# Patient Record
Sex: FEMALE | Race: WHITE | HISPANIC OR LATINO | Employment: FULL TIME | URBAN - METROPOLITAN AREA
[De-identification: names, ages, dates, MRNs, and addresses within clinical notes are randomized per-mention and may not be internally consistent; named-entity substitution may affect disease eponyms.]

---

## 2022-03-03 ENCOUNTER — TELEPHONE (OUTPATIENT)
Dept: SCHEDULING | Facility: IMAGING CENTER | Age: 37
End: 2022-03-03

## 2022-06-16 ENCOUNTER — APPOINTMENT (OUTPATIENT)
Dept: RADIOLOGY | Facility: MEDICAL CENTER | Age: 37
End: 2022-06-16
Attending: EMERGENCY MEDICINE
Payer: COMMERCIAL

## 2022-06-16 ENCOUNTER — HOSPITAL ENCOUNTER (EMERGENCY)
Facility: MEDICAL CENTER | Age: 37
End: 2022-06-16
Attending: EMERGENCY MEDICINE
Payer: COMMERCIAL

## 2022-06-16 VITALS
TEMPERATURE: 97.5 F | OXYGEN SATURATION: 93 % | BODY MASS INDEX: 36.31 KG/M2 | DIASTOLIC BLOOD PRESSURE: 87 MMHG | HEART RATE: 111 BPM | SYSTOLIC BLOOD PRESSURE: 142 MMHG | WEIGHT: 184.97 LBS | RESPIRATION RATE: 15 BRPM | HEIGHT: 60 IN

## 2022-06-16 DIAGNOSIS — K80.20 CALCULUS OF GALLBLADDER WITHOUT CHOLECYSTITIS WITHOUT OBSTRUCTION: ICD-10-CM

## 2022-06-16 LAB
ALBUMIN SERPL BCP-MCNC: 4.6 G/DL (ref 3.2–4.9)
ALBUMIN/GLOB SERPL: 1.3 G/DL
ALP SERPL-CCNC: 155 U/L (ref 30–99)
ALT SERPL-CCNC: 68 U/L (ref 2–50)
ANION GAP SERPL CALC-SCNC: 14 MMOL/L (ref 7–16)
APPEARANCE UR: CLEAR
AST SERPL-CCNC: 51 U/L (ref 12–45)
BASOPHILS # BLD AUTO: 0.4 % (ref 0–1.8)
BASOPHILS # BLD: 0.04 K/UL (ref 0–0.12)
BILIRUB SERPL-MCNC: 0.3 MG/DL (ref 0.1–1.5)
BILIRUB UR QL STRIP.AUTO: NEGATIVE
BUN SERPL-MCNC: 7 MG/DL (ref 8–22)
CALCIUM SERPL-MCNC: 9.6 MG/DL (ref 8.5–10.5)
CHLORIDE SERPL-SCNC: 101 MMOL/L (ref 96–112)
CO2 SERPL-SCNC: 22 MMOL/L (ref 20–33)
COLOR UR: YELLOW
CREAT SERPL-MCNC: 0.59 MG/DL (ref 0.5–1.4)
EOSINOPHIL # BLD AUTO: 0.1 K/UL (ref 0–0.51)
EOSINOPHIL NFR BLD: 1.1 % (ref 0–6.9)
ERYTHROCYTE [DISTWIDTH] IN BLOOD BY AUTOMATED COUNT: 34.6 FL (ref 35.9–50)
GFR SERPLBLD CREATININE-BSD FMLA CKD-EPI: 120 ML/MIN/1.73 M 2
GLOBULIN SER CALC-MCNC: 3.6 G/DL (ref 1.9–3.5)
GLUCOSE SERPL-MCNC: 197 MG/DL (ref 65–99)
GLUCOSE UR STRIP.AUTO-MCNC: NEGATIVE MG/DL
HCG SERPL QL: NEGATIVE
HCT VFR BLD AUTO: 42.7 % (ref 37–47)
HGB BLD-MCNC: 13.2 G/DL (ref 12–16)
IMM GRANULOCYTES # BLD AUTO: 0.03 K/UL (ref 0–0.11)
IMM GRANULOCYTES NFR BLD AUTO: 0.3 % (ref 0–0.9)
KETONES UR STRIP.AUTO-MCNC: NEGATIVE MG/DL
LEUKOCYTE ESTERASE UR QL STRIP.AUTO: NEGATIVE
LIPASE SERPL-CCNC: 39 U/L (ref 11–82)
LYMPHOCYTES # BLD AUTO: 1.96 K/UL (ref 1–4.8)
LYMPHOCYTES NFR BLD: 21.6 % (ref 22–41)
MCH RBC QN AUTO: 22 PG (ref 27–33)
MCHC RBC AUTO-ENTMCNC: 30.9 G/DL (ref 33.6–35)
MCV RBC AUTO: 71.3 FL (ref 81.4–97.8)
MICRO URNS: NORMAL
MONOCYTES # BLD AUTO: 0.45 K/UL (ref 0–0.85)
MONOCYTES NFR BLD AUTO: 5 % (ref 0–13.4)
NEUTROPHILS # BLD AUTO: 6.5 K/UL (ref 2–7.15)
NEUTROPHILS NFR BLD: 71.6 % (ref 44–72)
NITRITE UR QL STRIP.AUTO: NEGATIVE
NRBC # BLD AUTO: 0 K/UL
NRBC BLD-RTO: 0 /100 WBC
PH UR STRIP.AUTO: 6.5 [PH] (ref 5–8)
PLATELET # BLD AUTO: 293 K/UL (ref 164–446)
PMV BLD AUTO: 9.9 FL (ref 9–12.9)
POTASSIUM SERPL-SCNC: 4.2 MMOL/L (ref 3.6–5.5)
PROT SERPL-MCNC: 8.2 G/DL (ref 6–8.2)
PROT UR QL STRIP: NEGATIVE MG/DL
RBC # BLD AUTO: 5.99 M/UL (ref 4.2–5.4)
RBC UR QL AUTO: NEGATIVE
SODIUM SERPL-SCNC: 137 MMOL/L (ref 135–145)
SP GR UR STRIP.AUTO: 1.01
UROBILINOGEN UR STRIP.AUTO-MCNC: 0.2 MG/DL
WBC # BLD AUTO: 9.1 K/UL (ref 4.8–10.8)

## 2022-06-16 PROCEDURE — 76705 ECHO EXAM OF ABDOMEN: CPT

## 2022-06-16 PROCEDURE — 99222 1ST HOSP IP/OBS MODERATE 55: CPT | Performed by: SURGERY

## 2022-06-16 PROCEDURE — 36415 COLL VENOUS BLD VENIPUNCTURE: CPT

## 2022-06-16 PROCEDURE — 85025 COMPLETE CBC W/AUTO DIFF WBC: CPT

## 2022-06-16 PROCEDURE — 80053 COMPREHEN METABOLIC PANEL: CPT

## 2022-06-16 PROCEDURE — 81003 URINALYSIS AUTO W/O SCOPE: CPT

## 2022-06-16 PROCEDURE — 99284 EMERGENCY DEPT VISIT MOD MDM: CPT

## 2022-06-16 PROCEDURE — 700105 HCHG RX REV CODE 258: Performed by: EMERGENCY MEDICINE

## 2022-06-16 PROCEDURE — 83690 ASSAY OF LIPASE: CPT

## 2022-06-16 PROCEDURE — 84703 CHORIONIC GONADOTROPIN ASSAY: CPT

## 2022-06-16 RX ORDER — SODIUM CHLORIDE 9 MG/ML
1000 INJECTION, SOLUTION INTRAVENOUS ONCE
Status: COMPLETED | OUTPATIENT
Start: 2022-06-16 | End: 2022-06-16

## 2022-06-16 RX ADMIN — SODIUM CHLORIDE 1000 ML: 9 INJECTION, SOLUTION INTRAVENOUS at 15:09

## 2022-06-16 NOTE — CONSULTS
DATE OF CONSULTATION:  6/16/2022     REFERRING PHYSICIAN:   Asad Garber M.D.     CONSULTING PHYSICIAN:  Regulo Elder M.D.     REASON FOR CONSULTATION:  I have been asked by  to see the patient in surgical consultation for evaluation of cholelithiasis.    HISTORY OF PRESENT ILLNESS: The patient is a pleasant young woman who presents to the Emergency Department a first episode of biliary colic.  Her pain came on suddenly after a meal last night and was localized to the right upper quadrant.  It was colicky in nature with radiation to the epigastrium.  She denies any significant nausea or vomiting.  She denies any previous attacks.  She has not undergone any previous abdominal surgery.    PAST MEDICAL HISTORY:  has no past medical history on file.    PAST SURGICAL HISTORY:  has no past surgical history on file.    ALLERGIES: Not on File    CURRENT MEDICATIONS:    Home Medications       Reviewed by Denisse Oh R.N. (Registered Nurse) on 06/16/22 at 1043  Med List Status: Partial     Medication Last Dose Status        Patient Joseph Taking any Medications                         FAMILY HISTORY: family history is not on file.    SOCIAL HISTORY:  reports that she has never smoked. She has never used smokeless tobacco. She reports that she does not drink alcohol and does not use drugs.    REVIEW OF SYSTEMS: Comprehensive review of systems is negative with the exception of the aforementioned HPI, PMH, and PSH bullets in accordance with CMS guidelines.    PHYSICAL EXAMINATION:      Constitutional:     Vital Signs: /88   Pulse (!) 105   Temp 36.7 °C (98 °F) (Temporal)   Resp 16   Ht 1.524 m (5')   Wt 83.9 kg (184 lb 15.5 oz)   SpO2 97%    General Appearance: appears stated age, is in no apparent distress.  HEENT: The pupils are equal, round, and reactive to light bilaterally. The extraocular muscles are intact bilaterally. The sclera are non icteric. Nares and oropharynx are clear.   Neck: Supple.  No adenopathy.  Respiratory:   Inspection: Unlabored respirations, no intercostal retractions, paradoxical motion, or accessory muscle use.   Auscultation: clear to auscultation.  Cardiovascular:   Inspection: The skin is warm, dry, and well purfused.  Auscultation: Regular rate and rhythm.   Peripheral Pulses: Normal.   Abdomen:  Inspection: Abdominal inspection reveals  no abdominal distension.   Palpation: Palpation is remarkable for mild tenderness in the right subcostal region. No abdominal wall hernias.  Extremities:   Examination of the upper and lower extremities demonstrates no cyanosis edema or clubbing.  Neurologic:   Alert & oriented to person, time and place. Normal motor function. Normal sensory function. No focal deficits noted.    LABORATORY VALUES:   Recent Labs     06/16/22  1100   WBC 9.1   RBC 5.99*   HEMOGLOBIN 13.2   HEMATOCRIT 42.7   MCV 71.3*   MCH 22.0*   MCHC 30.9*   RDW 34.6*   PLATELETCT 293   MPV 9.9     Recent Labs     06/16/22  1100   SODIUM 137   POTASSIUM 4.2   CHLORIDE 101   CO2 22   GLUCOSE 197*   BUN 7*   CREATININE 0.59   CALCIUM 9.6     Recent Labs     06/16/22  1100   ASTSGOT 51*   ALTSGPT 68*   TBILIRUBIN 0.3   ALKPHOSPHAT 155*   GLOBULIN 3.6*     IMAGING:   US-RUQ   Final Result      1. Hepatomegaly and hepatic steatosis.   2. Cholelithiasis with no gallbladder wall thickening or pericholecystic fluid.   3. Dilated common bile duct, without intrahepatic biliary dilation.   4. The remainder of the right upper quadrant abdominal sonogram is within normal limits.          ASSESSMENT AND PLAN:     The patient has Grade I (mild) acute cholecystitis.     I had a lengthy discussion with the patient regarding the signs, symptoms, and natural history of biliary disease.  I explained that although she does have gallstones, a single attack does not mandate surgical intervention.  The surgical conduct was explained including the inherent risks and potential complications.  Nonsurgical  modalities were also explained.  I counseled her that it would be reasonable to remove her gallbladder following this first attempt especially in light of a dilated common bile duct and the potential for historical passage of a stone or sludge.  However, at this time she would prefer to pursue a course of watchful waiting and I think this is reasonable decision.  She was encouraged to follow-up with her regular physician or the emergency department for any future or recurrent biliary symptoms.  She expresses understanding of these instructions and states that she will comply.       ____________________________________     Regulo Elder M.D.    DD: 6/16/2022  3:41 PM

## 2022-06-16 NOTE — ED PROVIDER NOTES
ED Provider Note    CHIEF COMPLAINT  Chief Complaint   Patient presents with   • Abdominal Pain     RLQ since yesterday, worse this morning. Denies N/V, fevers       HPI  Mabel Roque is a 36 y.o. female who presents complaining of abdominal pain.  The nurses, at the right lower quadrant, however she holds up in her right upper quadrant as she describes it.  Started as a dullness yesterday, today it was sharper.  Is been persistent.  Nothing really makes it better or worse.  She did the biscuit for breakfast but has not had much else to eat or drink, since that time.  Denies any nausea vomiting she does not have an appetite.  Is been no change in bowel or bladder.  She never had this before.  There is no other complaint.    PAST MEDICAL HISTORY  None    FAMILY HISTORY  History reviewed. No pertinent family history.    SOCIAL HISTORY  Social History     Tobacco Use   • Smoking status: Never Smoker   • Smokeless tobacco: Never Used   Vaping Use   • Vaping Use: Never used   Substance Use Topics   • Alcohol use: Never   • Drug use: Never       SURGICAL HISTORY  History reviewed. No pertinent surgical history.    CURRENT MEDICATIONS  Home Medications     Reviewed by Denisse Oh R.N. (Registered Nurse) on 06/16/22 at 1043  Med List Status: Partial   Medication Last Dose Status        Patient Joseph Taking any Medications                       I have reviewed the nurses notes and/or the list brought with the patient.    ALLERGIES  Not on File    REVIEW OF SYSTEMS  See HPI for further details. Review of systems as above, otherwise all other systems are negative.     PHYSICAL EXAM  VITAL SIGNS: BP (!) 171/109 Comment: SECOND 161/108  Pulse (!) 124   Temp 36.7 °C (98 °F) (Temporal)   Resp 16   Ht 1.524 m (5')   Wt 83.9 kg (184 lb 15.5 oz)   SpO2 93%   BMI 36.12 kg/m²     Constitutional: Well appearing patient in no acute distress.  Not toxic, nor ill in appearance.  HENT: Mucus membranes moist.  Oropharynx  is clear.  Eyes: Pupils equally round.  No scleral icterus.   Neck: Full nontender range of motion.  Lymphatic: No cervical lymphadenopathy noted.   Cardiovascular: Regular heart rate and rhythm.  No murmurs, rubs, nor gallop appreciated.   Thorax & Lungs: Chest is nontender.  Lungs are clear to auscultation with good air movement bilaterally.  No wheeze, rhonchi, nor rales.   Abdomen: Soft, with focal tenderness in the right upper quadrant.  Positive clinical Villagran sign.  No rebound or guarding.  No masses.  Skin: No purpura nor petechia noted.  Extremities/Musculoskeletal: No sign of trauma.  Calves are nontender with no cords nor edema.  No Misty's sign.  Pulses are intact all around.   Neurologic: Alert & oriented.  Strength and sensation is intact all around.  Gait is normal.  Psychiatric: Normal affect appropriate for the clinical situation.  LABS  Labs Reviewed   CBC WITH DIFFERENTIAL - Abnormal; Notable for the following components:       Result Value    RBC 5.99 (*)     MCV 71.3 (*)     MCH 22.0 (*)     MCHC 30.9 (*)     RDW 34.6 (*)     Lymphocytes 21.60 (*)     All other components within normal limits   COMP METABOLIC PANEL - Abnormal; Notable for the following components:    Glucose 197 (*)     Bun 7 (*)     AST(SGOT) 51 (*)     ALT(SGPT) 68 (*)     Alkaline Phosphatase 155 (*)     Globulin 3.6 (*)     All other components within normal limits   HCG QUAL SERUM   URINALYSIS,CULTURE IF INDICATED    Narrative:     Indication for culture:->Patient WITHOUT an indwelling Gaming  catheter in place with new onset of Dysuria, Frequency,  Urgency, and/or Suprapubic pain   ESTIMATED GFR   LIPASE         RADIOLOGY/PROCEDURES  I have reviewed the patient's film interpretations myself, and they are read out by the radiologist as:   US-RUQ   Final Result      1. Hepatomegaly and hepatic steatosis.   2. Cholelithiasis with no gallbladder wall thickening or pericholecystic fluid.   3. Dilated common bile duct, without  intrahepatic biliary dilation.   4. The remainder of the right upper quadrant abdominal sonogram is within normal limits.        .    MEDICAL RECORD  I have reviewed patient's medical record and pertinent results are listed above.    COURSE & MEDICAL DECISION MAKING  I have reviewed any medical record information, laboratory studies and radiographic results as noted above.  This patient presents with abdominal pain.  Her examination concerning for gallbladder etiology.  Basic laboratories were obtained.  He has a minimal transaminitis but no elevation of her bilirubin.  There is no pancreatitis.  She is not pregnant.  Kidney function is normal.  Sonogram shows gallstones without gallbladder wall thickening or pericholecystic fluid.  She does have a dilated common bile duct but there is no intrahepatic biliary dilatation seen.  Upon recheck, she is still mildly tender.  I called discussed the patient's case with Dr. Masoud Elder, general surgery, who has come down and seen the patient.  The patient will be going home and following up with him as an outpatient.  We discussed return precautions to include increasing pain, fever, or any turn for the worse.  I advised her to avoid greasy or fatty foods.  Plenty of fluids.  In the meantime her significant other will be keeping an eye on her.      FINAL IMPRESSION  1. Calculus of gallbladder without cholecystitis without obstruction           This dictation was created using voice recognition software.    Electronically signed by: Asad Garber M.D., 6/16/2022 1:02 PM

## 2022-06-16 NOTE — ED TRIAGE NOTES
Chief Complaint   Patient presents with   • Abdominal Pain     RLQ since yesterday, worse this morning. Denies N/V, fevers       Patient to triage ambulatory with a steady gait, AAOx4, Appropriate precautions in place.     Explained wait time and triage process. Placed back in lobby. Told to notify ED tech or RN of any changes, verbalized understanding.    BP (!) 171/109 Comment: SECOND 161/108  Pulse (!) 124   Temp 36.7 °C (98 °F) (Temporal)   Resp 16   Ht 1.524 m (5')   Wt 83.9 kg (184 lb 15.5 oz)   SpO2 93%   BMI 36.12 kg/m²

## 2022-06-16 NOTE — ED NOTES
Provided pt with written and verbal instructions regarding follow-up and activity. All questions answered and patient verbalized understanding. Pt ambulated out of unit with steady gait.

## 2022-09-20 ENCOUNTER — TELEPHONE (OUTPATIENT)
Dept: SCHEDULING | Facility: IMAGING CENTER | Age: 37
End: 2022-09-20

## 2022-09-26 ENCOUNTER — OFFICE VISIT (OUTPATIENT)
Dept: MEDICAL GROUP | Facility: IMAGING CENTER | Age: 37
End: 2022-09-26
Payer: COMMERCIAL

## 2022-09-26 VITALS
BODY MASS INDEX: 3.79 KG/M2 | HEIGHT: 60 IN | SYSTOLIC BLOOD PRESSURE: 140 MMHG | RESPIRATION RATE: 16 BRPM | OXYGEN SATURATION: 96 % | DIASTOLIC BLOOD PRESSURE: 90 MMHG | WEIGHT: 19.3 LBS | TEMPERATURE: 98.3 F | HEART RATE: 98 BPM

## 2022-09-26 DIAGNOSIS — Z23 NEED FOR VACCINATION: ICD-10-CM

## 2022-09-26 DIAGNOSIS — M1A.0790 CHRONIC GOUT OF FOOT, UNSPECIFIED CAUSE, UNSPECIFIED LATERALITY: ICD-10-CM

## 2022-09-26 DIAGNOSIS — Z13.6 SCREENING FOR CARDIOVASCULAR CONDITION: ICD-10-CM

## 2022-09-26 DIAGNOSIS — Z13.1 DIABETES MELLITUS SCREENING: ICD-10-CM

## 2022-09-26 PROCEDURE — 90715 TDAP VACCINE 7 YRS/> IM: CPT | Performed by: CLINICAL NURSE SPECIALIST

## 2022-09-26 PROCEDURE — 99204 OFFICE O/P NEW MOD 45 MIN: CPT | Mod: 25 | Performed by: CLINICAL NURSE SPECIALIST

## 2022-09-26 PROCEDURE — 90471 IMMUNIZATION ADMIN: CPT | Performed by: CLINICAL NURSE SPECIALIST

## 2022-09-26 RX ORDER — ALLOPURINOL 100 MG/1
100 TABLET ORAL DAILY
Qty: 30 TABLET | Refills: 0 | Status: SHIPPED | OUTPATIENT
Start: 2022-09-26 | End: 2022-10-10 | Stop reason: SDUPTHER

## 2022-09-26 RX ORDER — COLCHICINE 0.6 MG/1
TABLET ORAL
COMMUNITY
Start: 2022-07-08 | End: 2022-09-26 | Stop reason: SDUPTHER

## 2022-09-26 RX ORDER — COLCHICINE 0.6 MG/1
TABLET ORAL
Qty: 12 TABLET | Refills: 0 | Status: SHIPPED | OUTPATIENT
Start: 2022-09-26 | End: 2023-09-14

## 2022-09-26 ASSESSMENT — PATIENT HEALTH QUESTIONNAIRE - PHQ9: CLINICAL INTERPRETATION OF PHQ2 SCORE: 0

## 2022-09-26 ASSESSMENT — PAIN SCALES - GENERAL: PAINLEVEL: NO PAIN

## 2022-09-26 ASSESSMENT — FIBROSIS 4 INDEX: FIB4 SCORE: 0.76

## 2022-09-26 NOTE — ASSESSMENT & PLAN NOTE
Gout usually in toes but sometimes in other foot areas for 4-5 years. Exacerbations multiple times annually and flares lasting longer and hurting more. She is interested in allopurinol.

## 2022-09-26 NOTE — PROGRESS NOTES
Subjective     Mabel Roque is a 37 y.o. female who presents with hospitals Care and Other (Gout x4 years )            HPI  Chronic gout of foot  Gout usually in toes but sometimes in other foot areas for 4-5 years. Exacerbations multiple times annually and flares lasting longer and hurting more. She is interested in allopurinol.      ROS  See HPI    No Known Allergies    No current outpatient medications on file prior to visit.     No current facility-administered medications on file prior to visit.              Objective     BP (!) 140/90   Pulse 98   Temp 36.8 °C (98.3 °F) (Temporal)   Resp 16   Ht 1.524 m (5')   Wt (!) 8.754 kg (19 lb 4.8 oz)   LMP  (LMP Unknown)   SpO2 96%   BMI 3.77 kg/m²      Physical Exam  Constitutional:       General: She is not in acute distress.     Appearance: Normal appearance. She is not ill-appearing, toxic-appearing or diaphoretic.   HENT:      Head: Normocephalic and atraumatic.   Eyes:      General: No scleral icterus.     Pupils: Pupils are equal, round, and reactive to light.   Cardiovascular:      Rate and Rhythm: Normal rate.   Pulmonary:      Effort: Pulmonary effort is normal.   Skin:     General: Skin is warm and dry.   Neurological:      Mental Status: She is alert and oriented to person, place, and time.      Gait: Gait normal.   Psychiatric:         Mood and Affect: Mood normal.         Behavior: Behavior normal.         Thought Content: Thought content normal.         Judgment: Judgment normal.                           Assessment & Plan        1. Chronic gout of foot, unspecified cause, unspecified laterality  Chronic uncontrolled.  We will start allopurinol 100 mg daily.  She will obtain labs in 2 to 4 weeks.  As there is a risk of gout flare with the start of allopurinol, she will be given colchicine to be taken only if she experiences a flare.    Start allopurinol 100 mg daily    - allopurinol (ZYLOPRIM) 100 MG Tab; Take 1 Tablet by mouth every day.   Dispense: 30 Tablet; Refill: 0  - colchicine (COLCRYS) 0.6 MG Tab; 1.2 mg at the first sign of flare, followed by 0.6 mg after 1 hour  Dispense: 12 Tablet; Refill: 0  - CBC WITH DIFFERENTIAL; Future  - Comp Metabolic Panel; Future  - URIC ACID; Future    2. Diabetes mellitus screening    - Comp Metabolic Panel; Future  - HEMOGLOBIN A1C; Future    3. Screening for cardiovascular condition    - Lipid Profile; Future  - TSH WITH REFLEX TO FT4; Future    4. Need for vaccination    - Tdap Vaccine =>6YO IM     Return if symptoms worsen or fail to improve, for 2-4 weeks, Med check.

## 2022-10-10 ENCOUNTER — OFFICE VISIT (OUTPATIENT)
Dept: MEDICAL GROUP | Facility: IMAGING CENTER | Age: 37
End: 2022-10-10
Payer: COMMERCIAL

## 2022-10-10 VITALS
HEART RATE: 84 BPM | OXYGEN SATURATION: 98 % | TEMPERATURE: 99 F | HEIGHT: 60 IN | WEIGHT: 180.4 LBS | BODY MASS INDEX: 35.42 KG/M2 | DIASTOLIC BLOOD PRESSURE: 80 MMHG | SYSTOLIC BLOOD PRESSURE: 122 MMHG

## 2022-10-10 DIAGNOSIS — M1A.0790 CHRONIC GOUT OF FOOT, UNSPECIFIED CAUSE, UNSPECIFIED LATERALITY: ICD-10-CM

## 2022-10-10 PROCEDURE — 99213 OFFICE O/P EST LOW 20 MIN: CPT | Performed by: CLINICAL NURSE SPECIALIST

## 2022-10-10 RX ORDER — ALLOPURINOL 100 MG/1
100 TABLET ORAL DAILY
Qty: 90 TABLET | Refills: 3 | Status: SHIPPED | OUTPATIENT
Start: 2022-10-10 | End: 2023-09-14 | Stop reason: SDUPTHER

## 2022-10-10 ASSESSMENT — PAIN SCALES - GENERAL: PAINLEVEL: NO PAIN

## 2022-10-10 ASSESSMENT — FIBROSIS 4 INDEX: FIB4 SCORE: 0.76

## 2022-10-10 NOTE — PROGRESS NOTES
Subjective     Mabel Roque is a 37 y.o. female who presents with Follow-Up            HPI  Blood pressure reading in healthy range today.    Chronic gout of foot  Tolerating allopurinol well. No flares.    ROS  See HPI     No Known Allergies    Current Outpatient Medications on File Prior to Visit   Medication Sig Dispense Refill    colchicine (COLCRYS) 0.6 MG Tab 1.2 mg at the first sign of flare, followed by 0.6 mg after 1 hour 12 Tablet 0     No current facility-administered medications on file prior to visit.           Objective     /80 (BP Location: Left arm, Patient Position: Sitting, BP Cuff Size: Adult)   Pulse 84   Temp 37.2 °C (99 °F) (Temporal)   Ht 1.524 m (5')   Wt 81.8 kg (180 lb 6.4 oz)   LMP  (LMP Unknown)   SpO2 98%   BMI 35.23 kg/m²      Physical Exam  Constitutional:       General: She is not in acute distress.     Appearance: Normal appearance. She is not ill-appearing, toxic-appearing or diaphoretic.   HENT:      Head: Normocephalic and atraumatic.   Eyes:      Pupils: Pupils are equal, round, and reactive to light.   Cardiovascular:      Rate and Rhythm: Normal rate and regular rhythm.      Pulses: Normal pulses.   Pulmonary:      Effort: Pulmonary effort is normal.   Skin:     General: Skin is warm and dry.   Neurological:      Mental Status: She is alert.      Gait: Gait normal.   Psychiatric:         Mood and Affect: Mood normal.         Behavior: Behavior normal.         Thought Content: Thought content normal.         Judgment: Judgment normal.                           Assessment & Plan        1. Chronic gout of foot, unspecified cause, unspecified laterality  Continue allopurinol 100mg.     - allopurinol (ZYLOPRIM) 100 MG Tab; Take 1 Tablet by mouth every day.  Dispense: 90 Tablet; Refill: 3       Return if symptoms worsen or fail to improve, for With test results.

## 2022-10-18 ENCOUNTER — APPOINTMENT (OUTPATIENT)
Dept: LAB | Facility: MEDICAL CENTER | Age: 37
End: 2022-10-18
Payer: COMMERCIAL

## 2022-10-24 ENCOUNTER — HOSPITAL ENCOUNTER (OUTPATIENT)
Dept: LAB | Facility: MEDICAL CENTER | Age: 37
End: 2022-10-24
Attending: CLINICAL NURSE SPECIALIST
Payer: COMMERCIAL

## 2022-10-24 DIAGNOSIS — M1A.0790 CHRONIC GOUT OF FOOT, UNSPECIFIED CAUSE, UNSPECIFIED LATERALITY: ICD-10-CM

## 2022-10-24 DIAGNOSIS — Z13.1 DIABETES MELLITUS SCREENING: ICD-10-CM

## 2022-10-24 DIAGNOSIS — Z13.6 SCREENING FOR CARDIOVASCULAR CONDITION: ICD-10-CM

## 2022-10-24 LAB
ALBUMIN SERPL BCP-MCNC: 4.6 G/DL (ref 3.2–4.9)
ALBUMIN/GLOB SERPL: 1.4 G/DL
ALP SERPL-CCNC: 154 U/L (ref 30–99)
ALT SERPL-CCNC: 58 U/L (ref 2–50)
ANION GAP SERPL CALC-SCNC: 16 MMOL/L (ref 7–16)
AST SERPL-CCNC: 33 U/L (ref 12–45)
BASOPHILS # BLD AUTO: 0.7 % (ref 0–1.8)
BASOPHILS # BLD: 0.05 K/UL (ref 0–0.12)
BILIRUB SERPL-MCNC: 0.3 MG/DL (ref 0.1–1.5)
BUN SERPL-MCNC: 12 MG/DL (ref 8–22)
CALCIUM SERPL-MCNC: 9.8 MG/DL (ref 8.5–10.5)
CHLORIDE SERPL-SCNC: 102 MMOL/L (ref 96–112)
CHOLEST SERPL-MCNC: 229 MG/DL (ref 100–199)
CO2 SERPL-SCNC: 22 MMOL/L (ref 20–33)
CREAT SERPL-MCNC: 0.66 MG/DL (ref 0.5–1.4)
EOSINOPHIL # BLD AUTO: 0.14 K/UL (ref 0–0.51)
EOSINOPHIL NFR BLD: 1.8 % (ref 0–6.9)
ERYTHROCYTE [DISTWIDTH] IN BLOOD BY AUTOMATED COUNT: 36.3 FL (ref 35.9–50)
EST. AVERAGE GLUCOSE BLD GHB EST-MCNC: 154 MG/DL
FASTING STATUS PATIENT QL REPORTED: NORMAL
GFR SERPLBLD CREATININE-BSD FMLA CKD-EPI: 116 ML/MIN/1.73 M 2
GLOBULIN SER CALC-MCNC: 3.3 G/DL (ref 1.9–3.5)
GLUCOSE SERPL-MCNC: 156 MG/DL (ref 65–99)
HBA1C MFR BLD: 7 % (ref 4–5.6)
HCT VFR BLD AUTO: 43.8 % (ref 37–47)
HDLC SERPL-MCNC: 38 MG/DL
HGB BLD-MCNC: 13.2 G/DL (ref 12–16)
IMM GRANULOCYTES # BLD AUTO: 0.03 K/UL (ref 0–0.11)
IMM GRANULOCYTES NFR BLD AUTO: 0.4 % (ref 0–0.9)
LDLC SERPL CALC-MCNC: ABNORMAL MG/DL
LYMPHOCYTES # BLD AUTO: 1.96 K/UL (ref 1–4.8)
LYMPHOCYTES NFR BLD: 25.6 % (ref 22–41)
MCH RBC QN AUTO: 22 PG (ref 27–33)
MCHC RBC AUTO-ENTMCNC: 30.1 G/DL (ref 33.6–35)
MCV RBC AUTO: 72.9 FL (ref 81.4–97.8)
MONOCYTES # BLD AUTO: 0.3 K/UL (ref 0–0.85)
MONOCYTES NFR BLD AUTO: 3.9 % (ref 0–13.4)
NEUTROPHILS # BLD AUTO: 5.17 K/UL (ref 2–7.15)
NEUTROPHILS NFR BLD: 67.6 % (ref 44–72)
NRBC # BLD AUTO: 0 K/UL
NRBC BLD-RTO: 0 /100 WBC
PLATELET # BLD AUTO: 298 K/UL (ref 164–446)
PMV BLD AUTO: 10.7 FL (ref 9–12.9)
POTASSIUM SERPL-SCNC: 4.7 MMOL/L (ref 3.6–5.5)
PROT SERPL-MCNC: 7.9 G/DL (ref 6–8.2)
RBC # BLD AUTO: 6.01 M/UL (ref 4.2–5.4)
SODIUM SERPL-SCNC: 140 MMOL/L (ref 135–145)
TRIGL SERPL-MCNC: 528 MG/DL (ref 0–149)
TSH SERPL DL<=0.005 MIU/L-ACNC: 2.2 UIU/ML (ref 0.38–5.33)
URATE SERPL-MCNC: 7.1 MG/DL (ref 1.9–8.2)
WBC # BLD AUTO: 7.7 K/UL (ref 4.8–10.8)

## 2022-10-24 PROCEDURE — 80053 COMPREHEN METABOLIC PANEL: CPT

## 2022-10-24 PROCEDURE — 84443 ASSAY THYROID STIM HORMONE: CPT

## 2022-10-24 PROCEDURE — 84550 ASSAY OF BLOOD/URIC ACID: CPT

## 2022-10-24 PROCEDURE — 85025 COMPLETE CBC W/AUTO DIFF WBC: CPT

## 2022-10-24 PROCEDURE — 83036 HEMOGLOBIN GLYCOSYLATED A1C: CPT

## 2022-10-24 PROCEDURE — 80061 LIPID PANEL: CPT

## 2022-10-24 PROCEDURE — 36415 COLL VENOUS BLD VENIPUNCTURE: CPT

## 2022-11-14 ENCOUNTER — OFFICE VISIT (OUTPATIENT)
Dept: MEDICAL GROUP | Facility: IMAGING CENTER | Age: 37
End: 2022-11-14
Payer: COMMERCIAL

## 2022-11-14 VITALS
TEMPERATURE: 98.5 F | RESPIRATION RATE: 16 BRPM | HEART RATE: 90 BPM | BODY MASS INDEX: 35.85 KG/M2 | WEIGHT: 182.6 LBS | HEIGHT: 60 IN | OXYGEN SATURATION: 100 % | DIASTOLIC BLOOD PRESSURE: 80 MMHG | SYSTOLIC BLOOD PRESSURE: 126 MMHG

## 2022-11-14 DIAGNOSIS — E66.9 OBESITY (BMI 35.0-39.9 WITHOUT COMORBIDITY): ICD-10-CM

## 2022-11-14 DIAGNOSIS — R71.8 MICROCYTOSIS: ICD-10-CM

## 2022-11-14 DIAGNOSIS — M1A.0790 CHRONIC GOUT OF FOOT, UNSPECIFIED CAUSE, UNSPECIFIED LATERALITY: ICD-10-CM

## 2022-11-14 DIAGNOSIS — E11.65 TYPE 2 DIABETES MELLITUS WITH HYPERGLYCEMIA, WITHOUT LONG-TERM CURRENT USE OF INSULIN (HCC): ICD-10-CM

## 2022-11-14 DIAGNOSIS — R16.0 HEPATOMEGALY: ICD-10-CM

## 2022-11-14 DIAGNOSIS — E78.5 DYSLIPIDEMIA: ICD-10-CM

## 2022-11-14 PROCEDURE — 99214 OFFICE O/P EST MOD 30 MIN: CPT | Performed by: CLINICAL NURSE SPECIALIST

## 2022-11-14 ASSESSMENT — FIBROSIS 4 INDEX: FIB4 SCORE: 0.52

## 2022-11-14 ASSESSMENT — PAIN SCALES - GENERAL: PAINLEVEL: NO PAIN

## 2022-11-14 NOTE — ASSESSMENT & PLAN NOTE
Mabel's recent labs show markedly elevated triglycerides, low HDL and high total cholesterol.  LDL was not calculated due to elevated triglycerides.  She also has concurrent diabetes.

## 2022-11-14 NOTE — ASSESSMENT & PLAN NOTE
A1c at 7.0 today and fasting blood sugar continues to be elevated. She has not been diagnosed with diabetes in the past.

## 2022-11-14 NOTE — PROGRESS NOTES
Subjective     Mabel Roque is a 37 y.o. female who presents with Lab Results (From 10/24/22)            HPI  Type 2 diabetes mellitus with hyperglycemia, without long-term current use of insulin (HCC)  A1c at 7.0 today and fasting blood sugar continues to be elevated. She has not been diagnosed with diabetes in the past.    Chronic gout of foot  No flares since starting allopurinol. She also tries to control through diet.     Dyslipidemia  Mabel's recent labs show markedly elevated triglycerides, low HDL and high total cholesterol.  LDL was not calculated due to elevated triglycerides.  She also has concurrent diabetes.    Hepatomegaly  Hepatomegaly with steatosis seen on ultrasound from ER approximately 6 months ago.  Her liver function tests have improved slightly most significant improvement was with her AST.  Her alkaline phosphatase continues to be elevated.  She denies any abdominal pain or unusual bowel movements.  She also reports that she only rarely drinks alcohol.    Microcytosis  Mabel continues to have microcytosis without anemia.  She also has increased RBCs.  She does not recall ever having any issues with this and reports she does not know if her family has a history of thalassemia.    ROS  See HPI    No Known Allergies    Current Outpatient Medications on File Prior to Visit   Medication Sig Dispense Refill    allopurinol (ZYLOPRIM) 100 MG Tab Take 1 Tablet by mouth every day. 90 Tablet 3    colchicine (COLCRYS) 0.6 MG Tab 1.2 mg at the first sign of flare, followed by 0.6 mg after 1 hour 12 Tablet 0     No current facility-administered medications on file prior to visit.              Objective     /80 (BP Location: Left arm, Patient Position: Sitting, BP Cuff Size: Adult)   Pulse 90   Temp 36.9 °C (98.5 °F) (Temporal)   Resp 16   Ht 1.524 m (5')   Wt 82.8 kg (182 lb 9.6 oz)   LMP 10/27/2022 (Exact Date)   SpO2 100%   BMI 35.66 kg/m²      Physical  Exam  Constitutional:       General: She is not in acute distress.     Appearance: Normal appearance. She is not ill-appearing, toxic-appearing or diaphoretic.   HENT:      Head: Normocephalic and atraumatic.   Eyes:      General: No scleral icterus.     Pupils: Pupils are equal, round, and reactive to light.   Cardiovascular:      Rate and Rhythm: Normal rate.   Pulmonary:      Effort: Pulmonary effort is normal.   Skin:     General: Skin is warm and dry.   Neurological:      Mental Status: She is alert and oriented to person, place, and time.      Gait: Gait normal.   Psychiatric:         Mood and Affect: Mood normal.         Behavior: Behavior normal.         Thought Content: Thought content normal.         Judgment: Judgment normal.          Hospital Outpatient Visit on 10/24/2022   Component Date Value    WBC 10/24/2022 7.7     RBC 10/24/2022 6.01 (H)     Hemoglobin 10/24/2022 13.2     Hematocrit 10/24/2022 43.8     MCV 10/24/2022 72.9 (L)     MCH 10/24/2022 22.0 (L)     MCHC 10/24/2022 30.1 (L)     RDW 10/24/2022 36.3     Platelet Count 10/24/2022 298     MPV 10/24/2022 10.7     Neutrophils-Polys 10/24/2022 67.60     Lymphocytes 10/24/2022 25.60     Monocytes 10/24/2022 3.90     Eosinophils 10/24/2022 1.80     Basophils 10/24/2022 0.70     Immature Granulocytes 10/24/2022 0.40     Nucleated RBC 10/24/2022 0.00     Neutrophils (Absolute) 10/24/2022 5.17     Lymphs (Absolute) 10/24/2022 1.96     Monos (Absolute) 10/24/2022 0.30     Eos (Absolute) 10/24/2022 0.14     Baso (Absolute) 10/24/2022 0.05     Immature Granulocytes (a* 10/24/2022 0.03     NRBC (Absolute) 10/24/2022 0.00     Sodium 10/24/2022 140     Potassium 10/24/2022 4.7     Chloride 10/24/2022 102     Co2 10/24/2022 22     Anion Gap 10/24/2022 16.0     Glucose 10/24/2022 156 (H)     Bun 10/24/2022 12     Creatinine 10/24/2022 0.66     Calcium 10/24/2022 9.8     AST(SGOT) 10/24/2022 33     ALT(SGPT) 10/24/2022 58 (H)     Alkaline Phosphatase  10/24/2022 154 (H)     Total Bilirubin 10/24/2022 0.3     Albumin 10/24/2022 4.6     Total Protein 10/24/2022 7.9     Globulin 10/24/2022 3.3     A-G Ratio 10/24/2022 1.4     Glycohemoglobin 10/24/2022 7.0 (H)     Est Avg Glucose 10/24/2022 154     Cholesterol,Tot 10/24/2022 229 (H)     Triglycerides 10/24/2022 528 (H)     HDL 10/24/2022 38 (A)     LDL 10/24/2022 see below     TSH 10/24/2022 2.200     Uric Acid 10/24/2022 7.1     Fasting Status 10/24/2022 Fasting     GFR (CKD-EPI) 10/24/2022 116                           Assessment & Plan      1. Type 2 diabetes mellitus with hyperglycemia, without long-term current use of insulin (HCC)  New diagnosis.  Recent fasting blood sugar 156 with A1c of 7.0.  Results consistent with diabetes mellitus type 2.  Recent imaging also showed hepatomegaly and probable fatty infiltrates.    She will work on lifestyle improvements including healthy eating, reduction in short-chain carbs such as pasta, rice and white bread.  She will increase her exercise including at least 150 minutes a week of exercise that increases heart rate and elicits sweating.  We will also start metformin today.  Repeat labs in 3 months.    START metformin 500 mg once daily for 3 days then twice daily thereafter.    - MICROALBUMIN CREAT RATIO URINE; Future  - metFORMIN (GLUCOPHAGE) 500 MG Tab; Take 1 Tablet by mouth 2 times a day with meals.  Dispense: 60 Tablet; Refill: 0    2. Chronic gout of foot, unspecified cause, unspecified laterality  Chronic, controlled.      Continue allopurinol 100 mg daily    3. Dyslipidemia  New diagnosis.  Mabel's  triglycerides are markedly elevated, > 500.  This is concerning for an acutely increased risk of pancreatitis.  She does not have any abdominal pain currently.  Total cholesterol slightly elevated and HDL slightly low.  Unable to calculate LDL due to high triglycerides.  Discussed signs of pancreatitis such as acute severe abdominal pain.  If these develop, she  is to go to the emergency department    Mabel will eat a low-fat diet during the upcoming week and repeat this test fasting next week.  Lifestyle improvements as above.    - Lipid Profile; Future    4. Hepatomegaly  Incidental finding on ultrasound 6/2022.  Concurrent probable hepatic steatosis.  She has an elevated BMI of 35.  She also has elevated liver enzymes, some with recent improvement but her alkaline phosphatase continues to be elevated at 154.  Isoenzymes and hepatitis panel ordered.    - ALKALINE PHOSPHATASE ISOENZYMES; Future  - HEPATITIS PANEL ACUTE(4 COMPONENTS); Future    5. Microcytosis  New diagnosis.  Most recent 2 labs show microcytosis without anemia and increased red blood cell count.  Most recent RDW was normal.  This is most consistent with thalassemia trait.  No known family history of thalassemia.  Follow-up labs ordered.    - IRON/TOTAL IRON BIND; Future  - FERRITIN; Future  - HAPTOGLOBIN; Future  - LDH; Future  - Hemoglobin Evaluation Rflx Cascade; Future    Return if symptoms worsen or fail to improve, for With test results.    The patient verbalized agreement and understanding of the current plan. All questions and concerns were addressed at the time of visit.    This note was dictated using Dragon Software.  I have made every reasonable attempt to correct errors, but errors of grammar and content may still exist.

## 2022-11-14 NOTE — ASSESSMENT & PLAN NOTE
Hepatomegaly with steatosis seen on ultrasound from ER approximately 6 months ago.  Her liver function tests have improved slightly most significant improvement was with her AST.  Her alkaline phosphatase continues to be elevated.  She denies any abdominal pain or unusual bowel movements.  She also reports that she only rarely drinks alcohol.

## 2022-11-14 NOTE — ASSESSMENT & PLAN NOTE
Mabel continues to have microcytosis without anemia.  She also has increased RBCs.  She does not recall ever having any issues with this and reports she does not know if her family has a history of thalassemia.

## 2022-11-22 ENCOUNTER — HOSPITAL ENCOUNTER (OUTPATIENT)
Dept: LAB | Facility: MEDICAL CENTER | Age: 37
End: 2022-11-22
Attending: CLINICAL NURSE SPECIALIST
Payer: COMMERCIAL

## 2022-11-22 DIAGNOSIS — R16.0 HEPATOMEGALY: ICD-10-CM

## 2022-11-22 DIAGNOSIS — R71.8 MICROCYTOSIS: ICD-10-CM

## 2022-11-22 DIAGNOSIS — E11.65 TYPE 2 DIABETES MELLITUS WITH HYPERGLYCEMIA, WITHOUT LONG-TERM CURRENT USE OF INSULIN (HCC): ICD-10-CM

## 2022-11-22 DIAGNOSIS — E78.5 DYSLIPIDEMIA: ICD-10-CM

## 2022-11-22 LAB
BASOPHILS # BLD AUTO: 0.6 % (ref 0–1.8)
BASOPHILS # BLD: 0.04 K/UL (ref 0–0.12)
CHOLEST SERPL-MCNC: 223 MG/DL (ref 100–199)
CREAT UR-MCNC: 213.64 MG/DL
EOSINOPHIL # BLD AUTO: 0.14 K/UL (ref 0–0.51)
EOSINOPHIL NFR BLD: 2.2 % (ref 0–6.9)
ERYTHROCYTE [DISTWIDTH] IN BLOOD BY AUTOMATED COUNT: 35.5 FL (ref 35.9–50)
FERRITIN SERPL-MCNC: 326 NG/ML (ref 10–291)
HAV IGM SERPL QL IA: NORMAL
HBV CORE IGM SER QL: NORMAL
HBV SURFACE AG SER QL: NORMAL
HCT VFR BLD AUTO: 43.2 % (ref 37–47)
HCV AB SER QL: NORMAL
HDLC SERPL-MCNC: 39 MG/DL
HGB BLD-MCNC: 14 G/DL (ref 12–16)
IMM GRANULOCYTES # BLD AUTO: 0.02 K/UL (ref 0–0.11)
IMM GRANULOCYTES NFR BLD AUTO: 0.3 % (ref 0–0.9)
IRON SATN MFR SERPL: 26 % (ref 15–55)
IRON SERPL-MCNC: 95 UG/DL (ref 40–170)
LDH SERPL L TO P-CCNC: 231 U/L (ref 107–266)
LDLC SERPL CALC-MCNC: 121 MG/DL
LYMPHOCYTES # BLD AUTO: 1.7 K/UL (ref 1–4.8)
LYMPHOCYTES NFR BLD: 27.2 % (ref 22–41)
MCH RBC QN AUTO: 23.5 PG (ref 27–33)
MCHC RBC AUTO-ENTMCNC: 32.4 G/DL (ref 33.6–35)
MCV RBC AUTO: 72.6 FL (ref 81.4–97.8)
MICROALBUMIN UR-MCNC: 14.1 MG/DL
MICROALBUMIN/CREAT UR: 66 MG/G (ref 0–30)
MONOCYTES # BLD AUTO: 0.27 K/UL (ref 0–0.85)
MONOCYTES NFR BLD AUTO: 4.3 % (ref 0–13.4)
NEUTROPHILS # BLD AUTO: 4.07 K/UL (ref 2–7.15)
NEUTROPHILS NFR BLD: 65.4 % (ref 44–72)
NRBC # BLD AUTO: 0 K/UL
NRBC BLD-RTO: 0 /100 WBC
PLATELET # BLD AUTO: 339 K/UL (ref 164–446)
PMV BLD AUTO: 10.9 FL (ref 9–12.9)
RBC # BLD AUTO: 5.95 M/UL (ref 4.2–5.4)
TIBC SERPL-MCNC: 366 UG/DL (ref 250–450)
TRIGL SERPL-MCNC: 317 MG/DL (ref 0–149)
UIBC SERPL-MCNC: 271 UG/DL (ref 110–370)
WBC # BLD AUTO: 6.2 K/UL (ref 4.8–10.8)

## 2022-11-22 PROCEDURE — 80074 ACUTE HEPATITIS PANEL: CPT

## 2022-11-22 PROCEDURE — 83021 HEMOGLOBIN CHROMOTOGRAPHY: CPT

## 2022-11-22 PROCEDURE — 84075 ASSAY ALKALINE PHOSPHATASE: CPT

## 2022-11-22 PROCEDURE — 84080 ASSAY ALKALINE PHOSPHATASES: CPT

## 2022-11-22 PROCEDURE — 82570 ASSAY OF URINE CREATININE: CPT

## 2022-11-22 PROCEDURE — 83540 ASSAY OF IRON: CPT

## 2022-11-22 PROCEDURE — 83010 ASSAY OF HAPTOGLOBIN QUANT: CPT

## 2022-11-22 PROCEDURE — 83020 HEMOGLOBIN ELECTROPHORESIS: CPT

## 2022-11-22 PROCEDURE — 82043 UR ALBUMIN QUANTITATIVE: CPT

## 2022-11-22 PROCEDURE — 36415 COLL VENOUS BLD VENIPUNCTURE: CPT

## 2022-11-22 PROCEDURE — 83550 IRON BINDING TEST: CPT

## 2022-11-22 PROCEDURE — 80061 LIPID PANEL: CPT

## 2022-11-22 PROCEDURE — 83615 LACTATE (LD) (LDH) ENZYME: CPT

## 2022-11-22 PROCEDURE — 82728 ASSAY OF FERRITIN: CPT

## 2022-11-22 PROCEDURE — 85025 COMPLETE CBC W/AUTO DIFF WBC: CPT

## 2022-11-22 PROCEDURE — 81269 HBA1/HBA2 GENE DUP/DEL VRNTS: CPT

## 2022-11-24 LAB — HAPTOGLOB SERPL-MCNC: 208 MG/DL (ref 30–200)

## 2022-11-26 LAB
ALP BONE SERPL-CCNC: 57 U/L (ref 0–55)
ALP ISOS SERPL HS-CCNC: 0 U/L
ALP LIVER SERPL-CCNC: 121 U/L (ref 0–94)
ALP SERPL-CCNC: 178 U/L (ref 40–120)

## 2022-11-29 ENCOUNTER — OFFICE VISIT (OUTPATIENT)
Dept: MEDICAL GROUP | Facility: IMAGING CENTER | Age: 37
End: 2022-11-29
Payer: COMMERCIAL

## 2022-11-29 VITALS
HEART RATE: 85 BPM | WEIGHT: 176.2 LBS | OXYGEN SATURATION: 96 % | BODY MASS INDEX: 34.59 KG/M2 | SYSTOLIC BLOOD PRESSURE: 138 MMHG | RESPIRATION RATE: 16 BRPM | HEIGHT: 60 IN | TEMPERATURE: 97.9 F | DIASTOLIC BLOOD PRESSURE: 96 MMHG

## 2022-11-29 DIAGNOSIS — R74.8 ELEVATED LIVER ENZYMES: ICD-10-CM

## 2022-11-29 DIAGNOSIS — M1A.0790 CHRONIC GOUT OF FOOT, UNSPECIFIED CAUSE, UNSPECIFIED LATERALITY: ICD-10-CM

## 2022-11-29 DIAGNOSIS — E11.65 TYPE 2 DIABETES MELLITUS WITH HYPERGLYCEMIA, WITHOUT LONG-TERM CURRENT USE OF INSULIN (HCC): ICD-10-CM

## 2022-11-29 DIAGNOSIS — E66.9 OBESITY (BMI 30.0-34.9): ICD-10-CM

## 2022-11-29 DIAGNOSIS — R71.8 MICROCYTOSIS: ICD-10-CM

## 2022-11-29 DIAGNOSIS — E78.5 DYSLIPIDEMIA: ICD-10-CM

## 2022-11-29 DIAGNOSIS — R16.0 HEPATOMEGALY: ICD-10-CM

## 2022-11-29 DIAGNOSIS — K80.20 CALCULUS OF GALLBLADDER WITHOUT CHOLECYSTITIS WITHOUT OBSTRUCTION: ICD-10-CM

## 2022-11-29 DIAGNOSIS — R03.0 ELEVATED BLOOD PRESSURE READING: ICD-10-CM

## 2022-11-29 PROBLEM — E66.811 OBESITY (BMI 30.0-34.9): Status: ACTIVE | Noted: 2022-11-14

## 2022-11-29 PROCEDURE — 99214 OFFICE O/P EST MOD 30 MIN: CPT | Performed by: CLINICAL NURSE SPECIALIST

## 2022-11-29 ASSESSMENT — FIBROSIS 4 INDEX: FIB4 SCORE: 0.46

## 2022-11-29 NOTE — ASSESSMENT & PLAN NOTE
Mabel has been eating less junk food, less sweets and carbs.  Eating chicken breast instead of thighs.  Started exercising as well.

## 2022-11-29 NOTE — PROGRESS NOTES
Subjective     Mabel Roque is a 37 y.o. female who presents with Follow-Up (labs) and Lab Results            HPI  Microcytosis  Mom has thalassemia without need for transfusion.     Dyslipidemia  Mabel has been eating less junk food, less sweets and carbs.  Eating chicken breast instead of thighs.  Started exercising as well.     Type 2 diabetes mellitus with hyperglycemia, without long-term current use of insulin (HCC)  Taking metformin 500mg twice daily.  Had some loose stool initially but now normalized.     Obesity (BMI 30.0-34.9)  BMI has reduced from 35 to 34.  Exercising more and eating more healthfully.    Chronic gout of foot  Taking allopurinol 100mg daily and no flares    ROS  See HPI    No Known Allergies    Current Outpatient Medications on File Prior to Visit   Medication Sig Dispense Refill    allopurinol (ZYLOPRIM) 100 MG Tab Take 1 Tablet by mouth every day. 90 Tablet 3    colchicine (COLCRYS) 0.6 MG Tab 1.2 mg at the first sign of flare, followed by 0.6 mg after 1 hour 12 Tablet 0     No current facility-administered medications on file prior to visit.              Objective     BP (!) 126/94 (BP Location: Left arm, Patient Position: Sitting, BP Cuff Size: Adult)   Pulse 85   Temp 36.6 °C (97.9 °F) (Temporal)   Resp 16   Ht 1.524 m (5')   Wt 79.9 kg (176 lb 3.2 oz)   SpO2 96%   BMI 34.41 kg/m²      Physical Exam  Constitutional:       General: She is not in acute distress.     Appearance: Normal appearance. She is not ill-appearing, toxic-appearing or diaphoretic.   HENT:      Head: Normocephalic and atraumatic.   Eyes:      General: No scleral icterus.     Pupils: Pupils are equal, round, and reactive to light.   Cardiovascular:      Rate and Rhythm: Normal rate.   Pulmonary:      Effort: Pulmonary effort is normal.   Abdominal:      Palpations: There is no hepatomegaly or splenomegaly.      Tenderness: There is no abdominal tenderness.   Skin:     General: Skin is warm  and dry.   Neurological:      Mental Status: She is alert and oriented to person, place, and time.      Gait: Gait normal.   Psychiatric:         Mood and Affect: Mood normal.         Behavior: Behavior normal.         Thought Content: Thought content normal.         Judgment: Judgment normal.          Hospital Outpatient Visit on 11/22/2022   Component Date Value    Hepatitis B Surface Anti* 11/22/2022 Non-Reactive     Hepatitis B Cors Ab,IgM 11/22/2022 Non-Reactive     Hepatitis A Virus Ab, IgM 11/22/2022 Non-Reactive     Hepatitis C Antibody 11/22/2022 Non-Reactive     Creatinine, Urine 11/22/2022 213.64     Microalbumin, Urine Rand* 11/22/2022 14.1     Micro Alb Creat Ratio 11/22/2022 66 (H)     Cholesterol,Tot 11/22/2022 223 (H)     Triglycerides 11/22/2022 317 (H)     HDL 11/22/2022 39 (A)     LDL 11/22/2022 121 (H)     Alkaline Phosphatase 11/22/2022 178 (H)     Liver Fractions 11/22/2022 121 (H)     Bone Fractions 11/22/2022 57 (H)     Alk Phos Other Calc 11/22/2022 0     LDH Total 11/22/2022 231     Haptoglobin 11/22/2022 208 (H)     Ferritin 11/22/2022 326.0 (H)     Iron 11/22/2022 95     Total Iron Binding 11/22/2022 366     Unsat Iron Binding 11/22/2022 271     % Saturation 11/22/2022 26     WBC 11/22/2022 6.2     RBC 11/22/2022 5.95 (H)     Hemoglobin 11/22/2022 14.0     Hematocrit 11/22/2022 43.2     MCV 11/22/2022 72.6 (L)     MCH 11/22/2022 23.5 (L)     MCHC 11/22/2022 32.4 (L)     RDW 11/22/2022 35.5 (L)     Platelet Count 11/22/2022 339     MPV 11/22/2022 10.9     Neutrophils-Polys 11/22/2022 65.40     Lymphocytes 11/22/2022 27.20     Monocytes 11/22/2022 4.30     Eosinophils 11/22/2022 2.20     Basophils 11/22/2022 0.60     Immature Granulocytes 11/22/2022 0.30     Nucleated RBC 11/22/2022 0.00     Neutrophils (Absolute) 11/22/2022 4.07     Lymphs (Absolute) 11/22/2022 1.70     Monos (Absolute) 11/22/2022 0.27     Eos (Absolute) 11/22/2022 0.14     Baso (Absolute) 11/22/2022 0.04     Immature  Granulocytes (a* 11/22/2022 0.02     NRBC (Absolute) 11/22/2022 0.00       Latest Reference Range & Units 10/24/22 09:37 11/22/22 10:56   Cholesterol,Tot 100 - 199 mg/dL 229 (H) 223 (H)   Triglycerides 0 - 149 mg/dL 528 (H) 317 (H)   HDL >=40 mg/dL 38 ! 39 !   LDL <100 mg/dL see below 121 (H)   (H): Data is abnormally high  !: Data is abnormal       Monofilament exam: 10 out of 10 bilaterally             Assessment & Plan      1. Microcytosis  Still awaiting hemoglobin cascade results.    2. Dyslipidemia  Triglycerides improved from 1 month ago.  Continue to work on healthy lifestyle choices including eating nutritious food and exercising regularly.  We will recheck labs in 3 months.  - Lipid Profile; Future    3. Hepatomegaly  Alkaline phosphatase isoenzymes show mainly elevated liver fraction although there is a slightly elevated bone fraction as well.  She is now asymptomatic.  I could not feel the liver today with palpation.  No jaundice.  Discussed follow-up imaging with a CT scan versus repeat labs and she chose repeat labs.  We will repeat in 2 months when she gets her A1c.  Referral placed to gastroenterology as well.  If she starts to experience abdominal pain she will let me know immediately or go to the ER.    - Referral to Gastroenterology  - Comp Metabolic Panel; Future  - FERRITIN; Future  - ALKALINE PHOSPHATASE ISOENZYMES; Future    4. Elevated liver enzymes  See #3  - Referral to Gastroenterology  - FERRITIN; Future  - ALKALINE PHOSPHATASE ISOENZYMES; Future    5. Calculus of gallbladder without cholecystitis without obstruction  Now asymptomatic.  She did not follow-up with surgery as symptoms resolved and the only option is gallbladder removal.  She discussed these plans with the surgeon during hospitalization.    - Referral to Gastroenterology    6. Type 2 diabetes mellitus with hyperglycemia, without long-term current use of insulin (HCC)  Tolerating metformin well.  Will increase dose and  recheck labs in 2 months.  Monofilament exam completed and referral to retinal screening placed.  Continue to work on healthy lifestyle choices including healthy eating and exercise.    INCREASE metformin to 1000 mg twice daily    - metFORMIN (GLUCOPHAGE) 1000 MG tablet; Take 1 Tablet by mouth 2 times a day with meals.  Dispense: 60 Tablet; Refill: 1  - Comp Metabolic Panel; Future  - Diabetic Monofilament LE Exam  - Referral for Retinal Screening Exam; Future    7. Obesity (BMI 30.0-34.9)  With healthy eating and exercise, she has lost approximately 6 pounds since her visit 2 weeks ago.    8. Chronic gout of foot, unspecified cause, unspecified laterality  Chronic, controlled on allopurinol    CONTINUE allopurinol 100 mg daily    9. Elevated blood pressure reading  Blood pressure reading elevated today in clinic.  The last 2 visits her blood pressure was in healthy range.  The visit before that, however, it was elevated as well.  We will continue to monitor         Return in about 2 months (around 1/29/2023), or if symptoms worsen or fail to improve, for With test results, F/U DM.    The patient verbalized agreement and understanding of the current plan. All questions and concerns were addressed at the time of visit.    This note was dictated using Dragon Software.  I have made every reasonable attempt to correct errors, but errors of grammar and content may still exist.

## 2022-12-11 LAB
ALPHA GLOBIN (HBA1, HBA2) DD Q5131: NORMAL
ALPHA THALASSEMIA SEQ Q5132: NORMAL
BETA GLOBIN (HBB) DEL/DUP Q5135: NORMAL
BETA GLOBIN FULL GENE SEQ Q5134: NORMAL
HBG1 + HBG2 FULL MUT ANL BLD/T SEQ: NORMAL
HGB A1 MFR BLD: 96.3 % (ref 95–97.9)
HGB A2 MFR BLD: 2.8 % (ref 2–3.5)
HGB C MFR BLD: 0 % (ref 0–0)
HGB CASCADE INTERPRETATION L503987A: NORMAL
HGB E MFR BLD: 0 % (ref 0–0)
HGB F MFR BLD: 0.9 % (ref 0–2.1)
HGB FRACT BLD ELPH-IMP: NORMAL
HGB OTHER MFR BLD: 0 % (ref 0–0)
HGB S BLD QL SOLY: NORMAL
HGB S MFR BLD: 0 % (ref 0–0)
PATH INTERP BLD-IMP: NORMAL

## 2023-01-22 DIAGNOSIS — E11.65 TYPE 2 DIABETES MELLITUS WITH HYPERGLYCEMIA, WITHOUT LONG-TERM CURRENT USE OF INSULIN (HCC): ICD-10-CM

## 2023-02-14 ENCOUNTER — HOSPITAL ENCOUNTER (OUTPATIENT)
Dept: LAB | Facility: MEDICAL CENTER | Age: 38
End: 2023-02-14
Attending: CLINICAL NURSE SPECIALIST
Payer: COMMERCIAL

## 2023-02-14 DIAGNOSIS — R74.8 ELEVATED LIVER ENZYMES: ICD-10-CM

## 2023-02-14 DIAGNOSIS — E78.5 DYSLIPIDEMIA: ICD-10-CM

## 2023-02-14 DIAGNOSIS — E11.65 TYPE 2 DIABETES MELLITUS WITH HYPERGLYCEMIA, WITHOUT LONG-TERM CURRENT USE OF INSULIN (HCC): ICD-10-CM

## 2023-02-14 DIAGNOSIS — R16.0 HEPATOMEGALY: ICD-10-CM

## 2023-02-14 LAB
ALBUMIN SERPL BCP-MCNC: 4.6 G/DL (ref 3.2–4.9)
ALBUMIN/GLOB SERPL: 1.4 G/DL
ALP SERPL-CCNC: 166 U/L (ref 30–99)
ALT SERPL-CCNC: 55 U/L (ref 2–50)
ANION GAP SERPL CALC-SCNC: 13 MMOL/L (ref 7–16)
AST SERPL-CCNC: 24 U/L (ref 12–45)
BILIRUB SERPL-MCNC: 0.4 MG/DL (ref 0.1–1.5)
BUN SERPL-MCNC: 8 MG/DL (ref 8–22)
CALCIUM ALBUM COR SERPL-MCNC: 9.4 MG/DL (ref 8.5–10.5)
CALCIUM SERPL-MCNC: 9.9 MG/DL (ref 8.5–10.5)
CHLORIDE SERPL-SCNC: 100 MMOL/L (ref 96–112)
CHOLEST SERPL-MCNC: 224 MG/DL (ref 100–199)
CO2 SERPL-SCNC: 23 MMOL/L (ref 20–33)
CREAT SERPL-MCNC: 0.61 MG/DL (ref 0.5–1.4)
FERRITIN SERPL-MCNC: 224 NG/ML (ref 10–291)
GFR SERPLBLD CREATININE-BSD FMLA CKD-EPI: 118 ML/MIN/1.73 M 2
GLOBULIN SER CALC-MCNC: 3.2 G/DL (ref 1.9–3.5)
GLUCOSE SERPL-MCNC: 123 MG/DL (ref 65–99)
HDLC SERPL-MCNC: 38 MG/DL
LDLC SERPL CALC-MCNC: 135 MG/DL
POTASSIUM SERPL-SCNC: 4.4 MMOL/L (ref 3.6–5.5)
PROT SERPL-MCNC: 7.8 G/DL (ref 6–8.2)
SODIUM SERPL-SCNC: 136 MMOL/L (ref 135–145)
TRIGL SERPL-MCNC: 257 MG/DL (ref 0–149)

## 2023-02-14 PROCEDURE — 84075 ASSAY ALKALINE PHOSPHATASE: CPT

## 2023-02-14 PROCEDURE — 80053 COMPREHEN METABOLIC PANEL: CPT

## 2023-02-14 PROCEDURE — 82728 ASSAY OF FERRITIN: CPT

## 2023-02-14 PROCEDURE — 84080 ASSAY ALKALINE PHOSPHATASES: CPT

## 2023-02-14 PROCEDURE — 80061 LIPID PANEL: CPT

## 2023-02-14 PROCEDURE — 36415 COLL VENOUS BLD VENIPUNCTURE: CPT

## 2023-02-17 LAB
ALP BONE SERPL-CCNC: 42 U/L (ref 0–55)
ALP ISOS SERPL HS-CCNC: 0 U/L
ALP LIVER SERPL-CCNC: 140 U/L (ref 0–94)
ALP SERPL-CCNC: 182 U/L (ref 40–120)

## 2023-02-21 ENCOUNTER — OFFICE VISIT (OUTPATIENT)
Dept: MEDICAL GROUP | Facility: IMAGING CENTER | Age: 38
End: 2023-02-21
Payer: COMMERCIAL

## 2023-02-21 VITALS
DIASTOLIC BLOOD PRESSURE: 60 MMHG | OXYGEN SATURATION: 100 % | HEART RATE: 87 BPM | SYSTOLIC BLOOD PRESSURE: 110 MMHG | HEIGHT: 60 IN | BODY MASS INDEX: 32.71 KG/M2 | RESPIRATION RATE: 16 BRPM | WEIGHT: 166.6 LBS | TEMPERATURE: 99.4 F

## 2023-02-21 DIAGNOSIS — Z13.5 SCREENING FOR DIABETIC RETINOPATHY: ICD-10-CM

## 2023-02-21 DIAGNOSIS — R16.0 HEPATOMEGALY: ICD-10-CM

## 2023-02-21 DIAGNOSIS — E11.65 TYPE 2 DIABETES MELLITUS WITH HYPERGLYCEMIA, WITHOUT LONG-TERM CURRENT USE OF INSULIN (HCC): ICD-10-CM

## 2023-02-21 LAB
HBA1C MFR BLD: 6.1 % (ref ?–5.8)
POCT INT CON NEG: NEGATIVE
POCT INT CON POS: POSITIVE

## 2023-02-21 PROCEDURE — 83036 HEMOGLOBIN GLYCOSYLATED A1C: CPT | Performed by: CLINICAL NURSE SPECIALIST

## 2023-02-21 PROCEDURE — 99214 OFFICE O/P EST MOD 30 MIN: CPT | Mod: 25 | Performed by: CLINICAL NURSE SPECIALIST

## 2023-02-21 ASSESSMENT — FIBROSIS 4 INDEX: FIB4 SCORE: 0.34

## 2023-02-21 ASSESSMENT — PATIENT HEALTH QUESTIONNAIRE - PHQ9: CLINICAL INTERPRETATION OF PHQ2 SCORE: 0

## 2023-02-21 ASSESSMENT — PAIN SCALES - GENERAL: PAINLEVEL: NO PAIN

## 2023-02-21 NOTE — PROGRESS NOTES
Subjective     Mabel Roque is a 37 y.o. female who presents with Lab Results (From 02/14/23)            HPI  Hepatomegaly  No abdominal pain. Rare alcohol.     Type 2 diabetes mellitus with hyperglycemia, without long-term current use of insulin (HCC)  Breakfast, overnight oats 5-6 days/week.  Lunch protein and veggies, snap peas, green beans, broccoli.  Dinner chicken, some carbs like rice, occasionally, fruit.      ROS  See HPI    No Known Allergies    Current Outpatient Medications on File Prior to Visit   Medication Sig Dispense Refill    metformin (GLUCOPHAGE) 1000 MG tablet Take 1 Tablet by mouth 2 times a day with meals. 60 Tablet 1    allopurinol (ZYLOPRIM) 100 MG Tab Take 1 Tablet by mouth every day. 90 Tablet 3    colchicine (COLCRYS) 0.6 MG Tab 1.2 mg at the first sign of flare, followed by 0.6 mg after 1 hour 12 Tablet 0     No current facility-administered medications on file prior to visit.              Objective     /60 (BP Location: Left arm, Patient Position: Sitting, BP Cuff Size: Adult)   Pulse 87   Temp 37.4 °C (99.4 °F) (Temporal)   Resp 16   Ht 1.524 m (5')   Wt 75.6 kg (166 lb 9.6 oz)   LMP 01/20/2023 (Within Days)   SpO2 100%   BMI 32.54 kg/m²      Physical Exam  Constitutional:       General: She is not in acute distress.     Appearance: Normal appearance. She is not ill-appearing, toxic-appearing or diaphoretic.   HENT:      Head: Normocephalic and atraumatic.   Eyes:      Pupils: Pupils are equal, round, and reactive to light.   Cardiovascular:      Rate and Rhythm: Normal rate.   Pulmonary:      Effort: Pulmonary effort is normal.   Abdominal:      General: Abdomen is flat.      Palpations: Abdomen is soft. There is no mass.      Tenderness: There is no abdominal tenderness. There is no guarding or rebound.   Skin:     General: Skin is warm and dry.   Neurological:      Mental Status: She is alert and oriented to person, place, and time.      Gait: Gait  normal.   Psychiatric:         Mood and Affect: Mood normal.         Behavior: Behavior normal.         Thought Content: Thought content normal.         Judgment: Judgment normal.                           Assessment & Plan      1. Screening for diabetic retinopathy  Exam performed by Ar DUDLEY    - POCT Retinal Eye Exam    2. Hepatomegaly  Chronic, liver function test have not improved.  Recent labs reviewed.  Alkaline phosphatase still elevated and higher than previously.  Fortunately, she is asymptomatic.  Infectious hepatitis testing negative.  She rarely drinks alcohol.  Referral placed to gastroenterology.    3. Type 2 diabetes mellitus with hyperglycemia, without long-term current use of insulin (HCC)  Chronic, controlled.  A1c improved to 6.1%.  Continue metformin 1000 mg twice daily.  We discussed possibly doing the ketogenic diet but she was informed that if she does this she should be closely monitored as she has elevated lipids already and a diagnosis of diabetes.  She verbalized understanding.    - POCT  A1C        Return if symptoms worsen or fail to improve.    The patient verbalized agreement and understanding of the current plan. All questions and concerns were addressed at the time of visit.    This note was dictated using Dragon Software.  I have made every reasonable attempt to correct errors, but errors of grammar and content may still exist.

## 2023-02-21 NOTE — ASSESSMENT & PLAN NOTE
Breakfast, overnight oats 5-6 days/week.  Lunch protein and veggies, snap peas, green beans, broccoli.  Dinner chicken, some carbs like rice, occasionally, fruit.

## 2023-02-22 DIAGNOSIS — E11.65 TYPE 2 DIABETES MELLITUS WITH HYPERGLYCEMIA, WITHOUT LONG-TERM CURRENT USE OF INSULIN (HCC): ICD-10-CM

## 2023-05-03 ENCOUNTER — APPOINTMENT (OUTPATIENT)
Dept: RADIOLOGY | Facility: MEDICAL CENTER | Age: 38
End: 2023-05-03
Attending: INTERNAL MEDICINE
Payer: COMMERCIAL

## 2023-06-14 ENCOUNTER — HOSPITAL ENCOUNTER (OUTPATIENT)
Dept: RADIOLOGY | Facility: MEDICAL CENTER | Age: 38
End: 2023-06-14
Attending: INTERNAL MEDICINE
Payer: COMMERCIAL

## 2023-06-14 ENCOUNTER — HOSPITAL ENCOUNTER (OUTPATIENT)
Dept: LAB | Facility: MEDICAL CENTER | Age: 38
End: 2023-06-14
Attending: INTERNAL MEDICINE
Payer: COMMERCIAL

## 2023-06-14 DIAGNOSIS — E78.2 MIXED HYPERLIPIDEMIA: ICD-10-CM

## 2023-06-14 DIAGNOSIS — E11.9 DIABETES MELLITUS WITHOUT COMPLICATION (HCC): ICD-10-CM

## 2023-06-14 DIAGNOSIS — R79.89 ELEVATED LIVER FUNCTION TESTS: ICD-10-CM

## 2023-06-14 PROCEDURE — 82465 ASSAY BLD/SERUM CHOLESTEROL: CPT

## 2023-06-14 PROCEDURE — 86381 MITOCHONDRIAL ANTIBODY EACH: CPT

## 2023-06-14 PROCEDURE — 86364 TISS TRNSGLTMNASE EA IG CLAS: CPT

## 2023-06-14 PROCEDURE — 84478 ASSAY OF TRIGLYCERIDES: CPT

## 2023-06-14 PROCEDURE — 86038 ANTINUCLEAR ANTIBODIES: CPT

## 2023-06-14 PROCEDURE — 84450 TRANSFERASE (AST) (SGOT): CPT

## 2023-06-14 PROCEDURE — 83883 ASSAY NEPHELOMETRY NOT SPEC: CPT

## 2023-06-14 PROCEDURE — 82947 ASSAY GLUCOSE BLOOD QUANT: CPT

## 2023-06-14 PROCEDURE — 82784 ASSAY IGA/IGD/IGG/IGM EACH: CPT

## 2023-06-14 PROCEDURE — 76700 US EXAM ABDOM COMPLETE: CPT

## 2023-06-14 PROCEDURE — 82103 ALPHA-1-ANTITRYPSIN TOTAL: CPT

## 2023-06-14 PROCEDURE — 82247 BILIRUBIN TOTAL: CPT

## 2023-06-14 PROCEDURE — 82390 ASSAY OF CERULOPLASMIN: CPT

## 2023-06-14 PROCEDURE — 86015 ACTIN ANTIBODY EACH: CPT

## 2023-06-14 PROCEDURE — 82172 ASSAY OF APOLIPOPROTEIN: CPT

## 2023-06-14 PROCEDURE — 36415 COLL VENOUS BLD VENIPUNCTURE: CPT

## 2023-06-14 PROCEDURE — 82977 ASSAY OF GGT: CPT

## 2023-06-14 PROCEDURE — 84460 ALANINE AMINO (ALT) (SGPT): CPT

## 2023-06-14 PROCEDURE — 83010 ASSAY OF HAPTOGLOBIN QUANT: CPT

## 2023-06-16 LAB
A1AT SERPL-MCNC: 136 MG/DL (ref 90–200)
CERULOPLASMIN SERPL-MCNC: 29 MG/DL (ref 16–45)
GGT SERPL-CCNC: 99 U/L (ref 5–36)
IGA SERPL-MCNC: 277 MG/DL (ref 68–408)
IGG SERPL-MCNC: 1023 MG/DL (ref 768–1632)
MITOCHONDRIA M2 IGG SER-ACNC: 1.9 UNITS (ref 0–24.9)
NUCLEAR IGG SER QL IA: NORMAL
SMA IGG SER-ACNC: 12 UNITS (ref 0–19)
TTG IGA SER IA-ACNC: <2 U/ML (ref 0–3)

## 2023-06-18 LAB
A2 MACROGLOB SERPL-MCNC: 115 MG/DL (ref 110–276)
ALT SERPL W P-5'-P-CCNC: 53 IU/L (ref 0–40)
APO A-I SERPL-MCNC: 152 MG/DL (ref 116–209)
AST SERPL W P-5'-P-CCNC: 28 IU/L (ref 0–40)
BILIRUB SERPL-MCNC: 0.4 MG/DL (ref 0–1.2)
CHOLEST SERPL-MCNC: 261 MG/DL (ref 100–199)
FIBROSIS SCORING 550107: ABNORMAL
FIBROSIS STAGE SERPL QL: ABNORMAL
GGT SERPL-CCNC: 98 IU/L (ref 0–60)
GLUCOSE SERPL-MCNC: 106 MG/DL (ref 70–99)
HAPTOGLOB SERPL-MCNC: 217 MG/DL (ref 33–278)
INTERPRETATIONS: Z4787: ABNORMAL
LABORATORY COMMENT REPORT: ABNORMAL
LIVER FIBR SCORE SERPL CALC.FIBROSURE: 0.05 (ref 0–0.21)
NASH SCORING Z4789: ABNORMAL
NECROINFLAMMATORY ACT GRADE SERPL QL: ABNORMAL
NECROINFLAMMATORY ACT SCORE SERPL: 0.5
SERVICE CMNT-IMP: ABNORMAL
STEATOSIS GRADE Z4778: ABNORMAL
STEATOSIS GRADING Z4788: ABNORMAL
STEATOSIS SCORE Z4777: 0.87 (ref 0–0.3)
TRIGL SERPL-MCNC: 278 MG/DL (ref 0–149)

## 2023-07-02 ENCOUNTER — OFFICE VISIT (OUTPATIENT)
Dept: URGENT CARE | Facility: PHYSICIAN GROUP | Age: 38
End: 2023-07-02
Payer: COMMERCIAL

## 2023-07-02 VITALS
OXYGEN SATURATION: 98 % | WEIGHT: 164 LBS | RESPIRATION RATE: 16 BRPM | TEMPERATURE: 98.3 F | HEIGHT: 60 IN | DIASTOLIC BLOOD PRESSURE: 80 MMHG | BODY MASS INDEX: 32.2 KG/M2 | SYSTOLIC BLOOD PRESSURE: 110 MMHG | HEART RATE: 95 BPM

## 2023-07-02 DIAGNOSIS — M10.9 ACUTE GOUT OF LEFT FOOT, UNSPECIFIED CAUSE: ICD-10-CM

## 2023-07-02 PROCEDURE — 3074F SYST BP LT 130 MM HG: CPT

## 2023-07-02 PROCEDURE — 99213 OFFICE O/P EST LOW 20 MIN: CPT

## 2023-07-02 PROCEDURE — 3079F DIAST BP 80-89 MM HG: CPT

## 2023-07-02 RX ORDER — PREDNISONE 20 MG/1
40 TABLET ORAL DAILY
Qty: 10 TABLET | Refills: 0 | Status: SHIPPED | OUTPATIENT
Start: 2023-07-02 | End: 2023-07-07

## 2023-07-02 RX ORDER — INDOMETHACIN 50 MG/1
CAPSULE ORAL
Qty: 90 CAPSULE | Refills: 0 | Status: SHIPPED | OUTPATIENT
Start: 2023-07-02 | End: 2023-09-14

## 2023-07-02 ASSESSMENT — FIBROSIS 4 INDEX: FIB4 SCORE: 0.41

## 2023-07-02 NOTE — PROGRESS NOTES
Chief Complaint   Patient presents with    Gout     Hx of gout.   Left foot x12 days        HISTORY OF PRESENT ILLNESS: Patient is a pleasant 37 y.o. female who presents to urgent care today left-sided foot pain, patient has a history of gout, she states she has been taking allopurinol with note little to no relief.  Noted some mild redness and swelling especially at the big toe.  No noted fevers.    Patient Active Problem List    Diagnosis Date Noted    Elevated blood pressure reading 2022    Type 2 diabetes mellitus with hyperglycemia, without long-term current use of insulin (HCC) 2022    Dyslipidemia 2022    Hepatomegaly 2022    Microcytosis 2022    Obesity (BMI 30.0-34.9) 2022    Chronic gout of foot 2022       Allergies:Patient has no known allergies.    Current Outpatient Medications Ordered in Epic   Medication Sig Dispense Refill    indomethacin (INDOCIN) 50 MG Cap Take 1 capsule tid; discontinue 2 to 3 days after resolution of pain; usual duration: 5 to 7 days 90 Capsule 0    predniSONE (DELTASONE) 20 MG Tab Take 2 Tablets by mouth every day for 5 days. 10 Tablet 0    metformin (GLUCOPHAGE) 1000 MG tablet Take 1 Tablet by mouth 2 times a day with meals. 180 Tablet 1    allopurinol (ZYLOPRIM) 100 MG Tab Take 1 Tablet by mouth every day. 90 Tablet 3    colchicine (COLCRYS) 0.6 MG Tab 1.2 mg at the first sign of flare, followed by 0.6 mg after 1 hour 12 Tablet 0     No current Epic-ordered facility-administered medications on file.       Past Medical History:   Diagnosis Date    Gallstone     Gout        Social History     Tobacco Use    Smoking status: Never    Smokeless tobacco: Never   Vaping Use    Vaping Use: Never used   Substance Use Topics    Alcohol use: Never    Drug use: Never       Family Status   Relation Name Status    Mo  Alive    Fa      Bro  Alive    Bro  Alive     Family History   Problem Relation Age of Onset    Hyperlipidemia Mother      Hypertension Mother     Thyroid Mother     Gout Brother        ROS:  Review of Systems   Constitutional: Negative for fever, chills, weight loss, malaise, and fatigue.   HENT: Negative for ear pain, nosebleeds, congestion, sore throat and neck pain.    Eyes: Negative for vision changes.   Neuro: Negative for headache, sensory changes, weakness, seizure, LOC.   Cardiovascular: Negative for chest pain, palpitations, orthopnea and leg swelling.   Respiratory: Negative for cough, sputum production, shortness of breath and wheezing.   Gastrointestinal: Negative for abdominal pain, nausea, vomiting or diarrhea.   Genitourinary: Negative for dysuria, urgency and frequency.  Musculoskeletal: Negative for falls, neck pain, back pain, joint pain, myalgias.  Noted left foot and big toe pain  Skin: Negative for rash, diaphoresis.  Mild redness and swelling to the left foot    Exam:  /80 (BP Location: Left arm, Patient Position: Sitting, BP Cuff Size: Adult)   Pulse 95   Temp 36.8 °C (98.3 °F) (Temporal)   Resp 16   Ht 1.524 m (5')   Wt 74.4 kg (164 lb)   SpO2 98%   General: well-nourished, well-developed female in NAD  Head: normocephalic, atraumatic  Eyes: PERRLA, no conjunctival injection, acuity grossly intact, lids normal.  Ears: normal shape and symmetry, no tenderness, no discharge. External canals are without any significant edema or erythema. Tympanic membranes are without any inflammation, no effusion. Gross auditory acuity is intact.  Nose: symmetrical without tenderness, no discharge.  Mouth/Throat: reasonable hygiene, no erythema, exudates or tonsillar enlargement.  Neck: no masses, range of motion within normal limits, no tracheal deviation. No obvious thyroid enlargement.   Lymph: no cervical adenopathy. No supraclavicular adenopathy.   Neuro: alert and oriented. Cranial nerves 1-12 grossly intact. No sensory deficit.   Cardiovascular: regular rate and rhythm. No edema.  Pulmonary: no distress. Chest  is symmetrical with respiration, no wheezes, crackles, or rhonchi.   Abdomen: soft, non-tender, no guarding, no hepatosplenomegaly.  Musculoskeletal: no clubbing, appropriate muscle tone, gait is stable.  Patient has full mobility of her foot on the left side  Skin: warm, dry, intact, no clubbing, no cyanosis, no rashes.  Mild redness and swelling at the joint of the big toe and foot on the left side.  Psych: appropriate mood, affect, judgement.         Assessment/Plan:  1. Acute gout of left foot, unspecified cause  indomethacin (INDOCIN) 50 MG Cap    predniSONE (DELTASONE) 20 MG Tab        This is a 37-year-old female who comes in today with a history of gout.  Complains of a gout attack for the last 12 days, she has been taking allopurinol with little to no relief.  Pain noted especially to the left foot at the big toe joint.  On physical exam mild redness and some swelling noted to the joint of the left big toe.  Patient has full mobility of her foot.  No known cause of injury otherwise.  Patient is aware of dietary restrictions.  Patient placed on indomethacin and prednisone, advised patient to take these with food, advised to follow-up if she continues to get worse or does not improve.    Supportive care, differential diagnoses, and indications for immediate follow-up discussed with patient.   Pathogenesis of diagnosis discussed including typical length and natural progression.   Instructed to return to clinic or nearest emergency department for any change in condition, further concerns, or worsening of symptoms.  Patient states understanding of the plan of care and discharge instructions.  Instructed to make an appointment, for follow up, with primary care provider.      Please note that this dictation was created using voice recognition software. I have made every reasonable attempt to correct obvious errors, but I expect that there are errors of grammar and possibly content that I did not discover before  finalizing the note.      Verona HUERTA

## 2023-07-31 ENCOUNTER — TELEPHONE (OUTPATIENT)
Dept: SCHEDULING | Facility: IMAGING CENTER | Age: 38
End: 2023-07-31

## 2023-09-14 ENCOUNTER — OFFICE VISIT (OUTPATIENT)
Dept: MEDICAL GROUP | Facility: IMAGING CENTER | Age: 38
End: 2023-09-14
Payer: COMMERCIAL

## 2023-09-14 VITALS
OXYGEN SATURATION: 100 % | BODY MASS INDEX: 32.67 KG/M2 | HEART RATE: 90 BPM | DIASTOLIC BLOOD PRESSURE: 80 MMHG | SYSTOLIC BLOOD PRESSURE: 122 MMHG | WEIGHT: 166.4 LBS | RESPIRATION RATE: 16 BRPM | HEIGHT: 60 IN | TEMPERATURE: 97.5 F

## 2023-09-14 DIAGNOSIS — E78.5 DYSLIPIDEMIA (HIGH LDL; LOW HDL): ICD-10-CM

## 2023-09-14 DIAGNOSIS — E66.9 OBESITY (BMI 30.0-34.9): ICD-10-CM

## 2023-09-14 DIAGNOSIS — R16.0 HEPATOMEGALY: ICD-10-CM

## 2023-09-14 DIAGNOSIS — E11.9 TYPE 2 DIABETES MELLITUS WITHOUT COMPLICATION, WITHOUT LONG-TERM CURRENT USE OF INSULIN (HCC): ICD-10-CM

## 2023-09-14 DIAGNOSIS — M1A.0790 CHRONIC GOUT OF FOOT, UNSPECIFIED CAUSE, UNSPECIFIED LATERALITY: ICD-10-CM

## 2023-09-14 LAB
HBA1C MFR BLD: 6.1 % (ref ?–5.8)
POCT INT CON NEG: NEGATIVE
POCT INT CON POS: POSITIVE

## 2023-09-14 PROCEDURE — 83036 HEMOGLOBIN GLYCOSYLATED A1C: CPT

## 2023-09-14 PROCEDURE — 3074F SYST BP LT 130 MM HG: CPT

## 2023-09-14 PROCEDURE — 3079F DIAST BP 80-89 MM HG: CPT

## 2023-09-14 PROCEDURE — 99214 OFFICE O/P EST MOD 30 MIN: CPT

## 2023-09-14 RX ORDER — ALLOPURINOL 100 MG/1
100 TABLET ORAL DAILY
Qty: 90 TABLET | Refills: 3 | Status: SHIPPED | OUTPATIENT
Start: 2023-09-14

## 2023-09-14 RX ORDER — PREDNISONE 20 MG/1
20 TABLET ORAL 2 TIMES DAILY PRN
Qty: 10 TABLET | Refills: 0 | Status: SHIPPED | OUTPATIENT
Start: 2023-09-14 | End: 2023-09-19

## 2023-09-14 ASSESSMENT — FIBROSIS 4 INDEX: FIB4 SCORE: 0.42

## 2023-09-14 NOTE — PROGRESS NOTES
Chief Complaint   Patient presents with    Establish Care     PCP santhosh Albright reported medication refills for metformin , allopurinol and colchicine    Diabetes     A1C     HISTORY OF THE PRESENT ILLNESS: Patient is a 38 y.o. female. This pleasant patient is here today to establish care and to discuss:    To establish care  Previous PCP Santhosh Reynold    Type 2 DM  Chronic condition. Patient is taking metformin 1000 mg BID. Tolerating medication well without side effects. She has made dietary changes such as low carb. She goes to the Gym- 3x a week.  Her menstrual cycle normalized after starting metformin.   She is concern for pcos but she is asymptomatic.    Gout  Chronic condition. Patient is taking allopurinol. Tolerating medication well without side effects.  Last flare up was in July. She took colchicine which normally relieves her symptoms. However, medication was not effective. She continued to have lingering pain. She went to UC and was treated with prednisone and indomethacin which was effective.     Hepatomegaly  Chronic condition. Managed by GI. S/p abd imaging. Next f/u with GI will be after repeat of imaging.        Allergies: Patient has no known allergies.    Current Outpatient Medications Ordered in Epic   Medication Sig Dispense Refill    metformin (GLUCOPHAGE) 1000 MG tablet Take 1 Tablet by mouth 2 times a day with meals. 180 Tablet 3    allopurinol (ZYLOPRIM) 100 MG Tab Take 1 Tablet by mouth every day. 90 Tablet 3    predniSONE (DELTASONE) 20 MG Tab Take 1 Tablet by mouth 2 times a day as needed (gout flare up) for up to 5 days. 10 Tablet 0     No current Epic-ordered facility-administered medications on file.       Past Medical History:   Diagnosis Date    Gallstone     Gout        History reviewed. No pertinent surgical history.    Social History     Tobacco Use    Smoking status: Never    Smokeless tobacco: Never   Vaping Use    Vaping Use: Never used   Substance Use Topics    Alcohol use: Never     Drug use: Never       Social History     Social History Narrative    Not on file       Family History   Problem Relation Age of Onset    Hyperlipidemia Mother     Hypertension Mother     Thyroid Mother     Gout Brother        ROS:     - Constitutional: Negative for fever, chills, unexpected weight change, and fatigue/generalized weakness.     - HEENT: Negative for headaches, vision changes, hearing changes, ear pain, ear discharge, rhinorrhea, sinus congestion, sore throat, and neck pain.      - Respiratory: Negative for cough, sputum production, chest congestion, dyspnea, wheezing, and crackles.      - Cardiovascular: Negative for chest pain, palpitations, orthopnea, and bilateral lower extremity edema.     - Gastrointestinal: Negative for heartburn, nausea, vomiting, abdominal pain, hematochezia, melena, diarrhea, constipation, and greasy/foul-smelling stools.     - Genitourinary: Negative for dysuria, polyuria, hematuria, pyuria, urinary urgency, and urinary incontinence.     - Musculoskeletal: Negative for myalgias, back pain, and joint pain.     - Skin: Negative for rash, itching, cyanotic skin color change.     - Neurological: Negative for dizziness, tingling, tremors, focal sensory deficit, focal weakness and headaches.     - Endo/Heme/Allergies: Does not bruise/bleed easily.     - Psychiatric/Behavioral: Negative for depression, suicidal/homicidal ideation and memory loss.        Exam: /80 (BP Location: Left arm, Patient Position: Sitting, BP Cuff Size: Adult)   Pulse 90   Temp 36.4 °C (97.5 °F)   Resp 16   Ht 1.524 m (5')   Wt 75.5 kg (166 lb 6.4 oz)   SpO2 100%  Body mass index is 32.5 kg/m².    General: Normal appearing. No distress.  HEENT: Normocephalic. Eyes conjunctiva clear lids without ptosis, pupils equal and reactive to light accommodation, ears normal shape and contour, canals are clear bilaterally, tympanic membranes are benign, nasal mucosa benign, oropharynx is without erythema,  edema or exudates.   Neck: Supple without JVD or bruit. Thyroid is not enlarged.  Pulmonary: Clear to ausculation.  Normal effort. No rales, ronchi, or wheezing.  Cardiovascular: Regular rate and rhythm without murmur. Carotid and radial pulses are intact and equal bilaterally.  Abdomen: Soft, nontender, nondistended. Normal bowel sounds. Liver and spleen are not palpable  Neurologic: Grossly nonfocal  Lymph: No cervical, supraclavicular or axillary lymph nodes are palpable  Skin: Warm and dry.  No obvious lesions.  Musculoskeletal: Normal gait. No extremity cyanosis, clubbing, or edema.  Psych: Normal mood and affect. Alert and oriented x3. Judgment and insight is normal.    Please note that this dictation was created using voice recognition software. I have made every reasonable attempt to correct obvious errors, but I expect that there are errors of grammar and possibly content that I did not discover before finalizing the note.      Assessment/Plan    38 y.o. female with the following -    1. Type 2 diabetes mellitus without complication, without long-term current use of insulin (HCC)  Chronic and stable condition on metformin.  Med refill sent to pharmacy.  Medication administration and side effects discussed.  Recommend to continue with dietary modification and regular exercise for glucose control.  We will follow-up in 3 months.    - POCT Hemoglobin A1C  - metformin (GLUCOPHAGE) 1000 MG tablet; Take 1 Tablet by mouth 2 times a day with meals.  Dispense: 180 Tablet; Refill: 3    2. Obesity (BMI 30.0-34.9)  Recommend lifestyle and dietary changes such as low-carb diet, high in vegetables and fresh fruits.  Encouraged to increase water intake.  Regular physical exercise at least 30 minutes/day 5 days a week. Weight reduction if applicable (aim for 5% to 10% body weight increments).     3. Chronic gout of foot, unspecified cause, unspecified laterality  Chronic condition on allopurinol. Recent flare was in July  and did not respond to colchicine.   She did respond to prednisone during flare up. Will send prescription for prednisone for flareups.  Medication administration and side effects discussed.  Will refill allopurinol for maintenance.  Instructed to increase fluid intake and avoid foods high in purines.    - allopurinol (ZYLOPRIM) 100 MG Tab; Take 1 Tablet by mouth every day.  Dispense: 90 Tablet; Refill: 3  - predniSONE (DELTASONE) 20 MG Tab; Take 1 Tablet by mouth 2 times a day as needed (gout flare up) for up to 5 days.  Dispense: 10 Tablet; Refill: 0    4. Dyslipidemia (high LDL; low HDL)  Chronic condition. Discussed LDL goal of <100 with diabetes to reduce ASCVD risk.     Recommend lifestyle modification that include eating a healthy diet that is low in trans and saturated fat and high in fiber and potassium emphasizing on increasing fruits, vegetables, whole grains, poultry, fish and nuts (i.e. DASH diet/Mediterranean). Also, reduce salt intake to no more than 2,400 mg/day. Patient encouraged to increase physical activity and start an exercise regimen. Adults should engage in at least 150 minutes/week of moderate-intensity or 75 minutes/week of vigorous-intensity physical activity including resistance exercise which has proven to lower risk of atherosclerotic cardiovascular disease (ASCVD). Patient encouraged to maintain normal BMI and blood pressure control.   Will re-check labs in the 3 month-follow up. If lipids remain elevated, will consider starting statins.     - Lipid Profile; Future    5. Hepatomegaly  Chronic condition, stable.  Managed by GI.  Recommend to follow-up as scheduled.    Medical Decision Making/Course:  In the course of preparing for this visit with review of the pertinent past medical history, recent and past clinic visits, current medications, and performing chart, immunization, medical history and medication reconciliation, and in the further course of obtaining the current history  pertinent to the clinic visit today, performing an exam and evaluation, ordering and independently evaluating labs, tests, and/or procedures, prescribing any recommended new medications as noted above, providing any pertinent counseling and education and recommending further coordination of care. This was discussed with patient in a shared-decision making conversation, and they understand and agreed with plan of care.     Return in about 3 months (around 12/14/2023), or if symptoms worsen or fail to improve, for f/u labs, pap.    Thank you, Tiffany HUERTA  [facility]    Please note that this dictation was created using voice recognition software. I have made every reasonable attempt to correct obvious errors, but I expect that there are errors of grammar and possibly content that I did not discover before finalizing the note.

## 2023-11-20 ENCOUNTER — HOSPITAL ENCOUNTER (OUTPATIENT)
Dept: LAB | Facility: MEDICAL CENTER | Age: 38
End: 2023-11-20
Payer: COMMERCIAL

## 2023-11-20 DIAGNOSIS — E78.5 DYSLIPIDEMIA (HIGH LDL; LOW HDL): ICD-10-CM

## 2023-11-20 LAB
CHOLEST SERPL-MCNC: 229 MG/DL (ref 100–199)
HDLC SERPL-MCNC: 42 MG/DL
LDLC SERPL CALC-MCNC: 132 MG/DL
TRIGL SERPL-MCNC: 276 MG/DL (ref 0–149)

## 2023-11-20 PROCEDURE — 80061 LIPID PANEL: CPT

## 2023-11-20 PROCEDURE — 36415 COLL VENOUS BLD VENIPUNCTURE: CPT

## 2023-11-21 ENCOUNTER — HOSPITAL ENCOUNTER (OUTPATIENT)
Dept: RADIOLOGY | Facility: MEDICAL CENTER | Age: 38
End: 2023-11-21
Attending: INTERNAL MEDICINE
Payer: COMMERCIAL

## 2023-11-21 DIAGNOSIS — R93.2 ABNORMAL LIVER SCAN: ICD-10-CM

## 2023-11-21 DIAGNOSIS — R79.89 HYPOURICEMIA: ICD-10-CM

## 2023-11-21 PROCEDURE — 74181 MRI ABDOMEN W/O CONTRAST: CPT

## 2023-11-28 ENCOUNTER — OFFICE VISIT (OUTPATIENT)
Dept: MEDICAL GROUP | Facility: IMAGING CENTER | Age: 38
End: 2023-11-28
Payer: COMMERCIAL

## 2023-11-28 ENCOUNTER — HOSPITAL ENCOUNTER (OUTPATIENT)
Facility: MEDICAL CENTER | Age: 38
End: 2023-11-28
Payer: COMMERCIAL

## 2023-11-28 VITALS
WEIGHT: 163.2 LBS | RESPIRATION RATE: 14 BRPM | OXYGEN SATURATION: 99 % | BODY MASS INDEX: 32.04 KG/M2 | DIASTOLIC BLOOD PRESSURE: 78 MMHG | SYSTOLIC BLOOD PRESSURE: 102 MMHG | HEART RATE: 96 BPM | TEMPERATURE: 98 F | HEIGHT: 60 IN

## 2023-11-28 DIAGNOSIS — E11.9 TYPE 2 DIABETES MELLITUS WITHOUT COMPLICATION, WITHOUT LONG-TERM CURRENT USE OF INSULIN (HCC): ICD-10-CM

## 2023-11-28 DIAGNOSIS — Z12.4 SCREENING FOR CERVICAL CANCER: ICD-10-CM

## 2023-11-28 DIAGNOSIS — Z01.419 WOMEN'S ANNUAL ROUTINE GYNECOLOGICAL EXAMINATION: ICD-10-CM

## 2023-11-28 LAB
CREAT UR-MCNC: 121.85 MG/DL
MICROALBUMIN UR-MCNC: 5.3 MG/DL
MICROALBUMIN/CREAT UR: 43 MG/G (ref 0–30)

## 2023-11-28 PROCEDURE — 88175 CYTOPATH C/V AUTO FLUID REDO: CPT

## 2023-11-28 PROCEDURE — 82570 ASSAY OF URINE CREATININE: CPT

## 2023-11-28 PROCEDURE — 3078F DIAST BP <80 MM HG: CPT

## 2023-11-28 PROCEDURE — 3074F SYST BP LT 130 MM HG: CPT

## 2023-11-28 PROCEDURE — 82043 UR ALBUMIN QUANTITATIVE: CPT

## 2023-11-28 PROCEDURE — 99395 PREV VISIT EST AGE 18-39: CPT

## 2023-11-28 ASSESSMENT — FIBROSIS 4 INDEX: FIB4 SCORE: 0.42

## 2023-11-28 NOTE — PROGRESS NOTES
Subjective:     CC:   Chief Complaint   Patient presents with    Annual Exam     Physical     Gynecologic Exam     Pap basic  No breast exam        HPI:   Mabel Roque is a 38 y.o. female who presents for annual exam. She is feeling well and denies any complaints.    Ob-Gyn/ History:    Patient has GYN provider: no  /Para:  0  Last Pap Smear:  never. none history of abnormal pap smears.  Gyn Surgery:  0.  Current Contraceptive Method:  no. yes currently sexually active.  Last menstrual period:  .  Periods regular. moderate bleeding. Cramping is moderate.   She does take OTC analgesics for cramps.  No significant bloating/fluid retention, pelvic pain, or dyspareunia. No vaginal discharge  Post-menopausal bleeding: no  Urinary incontinence: no  Folate intake:no    Health Maintenance  Cholesterol Screening: up to date   Diabetes Screening: up to date   Diet: well-balanced, admits consistency is an issue but is working on it  Exercise: gym 3x a week   Substance Abuse: no   Safe in relationship. Yes   Seat belts, bike helmet, gun safety discussed.  Sun protection used.  Dentist: yes  Eye Doctor: yes    Cancer screening  Cervical Cancer Screening: up to date starting today   Breast Cancer Screening: n/a     Infectious disease screening/Immunizations  --STI Screening: decline   --Practices safe sex.  --HIV Screening: decline   --Hepatitis C Screening: decline   --Immunizations: decline     She  has a past medical history of Gallstone and Gout.  She  has no past surgical history on file.    Family History   Problem Relation Age of Onset    Hyperlipidemia Mother     Hypertension Mother     Thyroid Mother     Gout Brother        Social History     Socioeconomic History    Marital status: Single     Spouse name: Not on file    Number of children: Not on file    Years of education: Not on file    Highest education level: Not on file   Occupational History    Not on file   Tobacco Use    Smoking  status: Never    Smokeless tobacco: Never   Vaping Use    Vaping Use: Never used   Substance and Sexual Activity    Alcohol use: Never    Drug use: Never    Sexual activity: Yes     Partners: Male     Birth control/protection: Condom Male   Other Topics Concern    Not on file   Social History Narrative    Not on file     Social Determinants of Health     Financial Resource Strain: Not on file   Food Insecurity: Not on file   Transportation Needs: Not on file   Physical Activity: Not on file   Stress: Not on file   Social Connections: Not on file   Intimate Partner Violence: Not on file   Housing Stability: Not on file       Patient Active Problem List    Diagnosis Date Noted    Elevated blood pressure reading 11/29/2022    Type 2 diabetes mellitus without complication, without long-term current use of insulin (HCC) 11/14/2022    Dyslipidemia 11/14/2022    Hepatomegaly 11/14/2022    Microcytosis 11/14/2022    Obesity (BMI 30.0-34.9) 11/14/2022    Chronic gout of foot 09/26/2022         Current Outpatient Medications   Medication Sig Dispense Refill    allopurinol (ZYLOPRIM) 100 MG Tab Take 1 Tablet by mouth every day. 90 Tablet 3    metformin (GLUCOPHAGE) 1000 MG tablet Take 1 Tablet by mouth 2 times a day with meals. 180 Tablet 3     No current facility-administered medications for this visit.     No Known Allergies    Review of Systems   Constitutional: Negative for fever, chills and malaise/fatigue.   HENT: Negative for congestion.    Eyes: Negative for pain.    Respiratory: Negative for cough and shortness of breath.  Cardiovascular: Negative for leg swelling.   Gastrointestinal: Negative for nausea, vomiting, abdominal pain and diarrhea.   Genitourinary: Negative for dysuria and hematuria.   Skin: Negative for rash.   Neurological: Negative for dizziness, focal weakness and headaches.   Endo/Heme/Allergies: Does not bleed easily.   Psychiatric/Behavioral: Negative for depression.  The patient is not  nervous/anxious.      Objective:     /78 (BP Location: Left arm, Patient Position: Sitting, BP Cuff Size: Adult)   Pulse 96   Temp 36.7 °C (98 °F) (Temporal)   Resp 14   Ht 1.524 m (5')   Wt 74 kg (163 lb 3.2 oz)   LMP 11/06/2023   SpO2 99%   BMI 31.87 kg/m²   Body mass index is 31.87 kg/m².  Wt Readings from Last 4 Encounters:   11/28/23 74 kg (163 lb 3.2 oz)   09/14/23 75.5 kg (166 lb 6.4 oz)   07/02/23 74.4 kg (164 lb)   02/21/23 75.6 kg (166 lb 9.6 oz)       Physical Exam:  Constitutional: Well-developed and well-nourished. Not diaphoretic. No distress.   Skin: Skin is warm and dry. No rash noted.  Head: Atraumatic without lesions.  Eyes: Conjunctivae and extraocular motions are normal. Pupils are equal, round, and reactive to light. No scleral icterus.   Ears:  External ears unremarkable. Tympanic membranes clear and intact.  Nose: Nares patent. Septum midline. Turbinates without erythema nor edema. No discharge.   Mouth/Throat:   Neck: Supple, trachea midline. Normal range of motion. No thyromegaly present. No lymphadenopathy--cervical or supraclavicular.  Cardiovascular: Regular rate and rhythm, S1 and S2 without murmur, rubs, or gallops.  Lungs: Normal inspiratory effort, CTA bilaterally, no wheezes/rhonchi/rales  Breast: deferred  Abdomen: Soft, non tender, and without distention. Active bowel sounds in all four quadrants. No rebound, guarding, masses or HSM.  :Perineum and external genitalia normal without rash. Vagina with normal and physiologic discharge. Cervix without visible lesions or discharge. Bimanual exam without adnexal masses or cervical motion tenderness.  Extremities: No cyanosis, clubbing, erythema, nor edema. Distal pulses intact and symmetric.   Musculoskeletal: All major joints AROM full in all directions without pain.  Neurological: Alert and oriented x 3. DTRs 2+/3 and symmetric. No cranial nerve deficit. 5/5 myotomes. Sensation intact.   Psychiatric:  Behavior, mood,  and affect are appropriate.    A chaperone was offered to the patient during today's exam. Chaperone name: Wendy, MA was present.    Assessment and Plan:     1. Women's annual routine gynecological examination  PMH/PSH/FH/Social history reviewed. Medication reconciled. Vaccinations discussed. Previous records and labs reviewed. Discussed age appropriate anticipatory guidance.    2. Screening for cervical cancer    - THINPREP PAP, REFLEX HPV ON ASC-US AND ABOVE; Future    3. Type 2 diabetes mellitus without complication, without long-term current use of insulin (HCC)  Chronic, stable on metformin. Recommend to continue regimen.   Continue with the diet and regular exercise.  Recommend to follow-up with ophthalmologist for retinal screening.    - Diabetic Monofilament LE Exam  - Microalbumin Creat Ratio Urine; Future    HCM:  completed   Labs per orders  Immunizations per orders  Patient counseled about skin care, diet, supplements, prenatal vitamins, safe sex and exercise.    -Smoking cessation discussed if smoking and encouraged to come to office if quit and tempted or restarts smoking if pertinent to this patient. We discourage use of electronic smoking devises.   -Discussed healthy drinking habits if over 7 for females, 14 for males per week or more than 4 in one day.  -Discussed healthy eating habits, exercise, being physically active, healthy bmi below 25  -patient to provide ages of family members when they were diagnosed with cancer , especially breast and ovarian, pancreatic cancers.  -Reviewed pap history in female patients, if they have a gynecologist they are encouraged to follow up with that doctor for annual pelvic and breast exams. If they prefer to see me for women's health, I will perform pap smear with hpv dna testing, pelvic, and breast exam, these and male genital exams are always done in the presence of a medical assistant and with verbal permission from the patient.   -Colonoscopy history  reviewed with those over 44yo or those with early family h/o colon cancer. If patient is due we provide them with various colon cancer screen modalities and relevant information to help the patient decide which is best for them.   -Mammograms recommended yearly for women over 39yo. Risks and benefits are reviewed and discussed with the patient and mammogram script provided.   -PSA discussed with males with family history of prostate cancer or those concerned. The decision to order this test is made with the patient.   -If patient prescribed medicines then told to review package insert for any warnings, side effects, contra-indications and medication vs medication reactions.   -STD testing added to lab work due to patients age per guideline recommendations  -Patients screened for anxiety and depression. If positive screening patients are offered behavioral health services, medications, and tools to improve mood.   There are no diagnoses linked to this encounter.  -to improve bone health take calcium and vitamin D, perform weight-bearing exercise, in addition we can discuss additional medications if needed including bisphosphonates, parathyroid hormone, and raloxifene.  Esophageal irritation can occur with bisphosphonate therapy this can be reduced by not laying down for 30 min after taking and taking with a full glass of water  -if wearing nail polish on toes or hands asked to rto if there are any dark brown or black areas under the nails  If lab tests ordered, then patient instructed to go to lab/location/plan  If imaging tests ordered, then patient instructed to go to radiology/location/plan  If medicines ordered, then patient instructed to go to pharmacy/location/plan  Health maintenance I reviewed both men and women's health maintenance  Leading causes of death are motor vehicle accidents, cardiovascular disease, malignant tumors, and HIV.   Breast and ovarian cancer mutation screening was suggested if there was  an increased risk for the patient based on devyn scoring.   -A general visit to see the eye doctor every one to two years was thought appropriate. Dentist ever 6-12 months.  -Immunization suggestions: Tetanus shot every 10 years, Influenza immunization pneumococcal- anyone with chronic illnesses     Follow-up: Return in about 1 year (around 11/28/2024), or if symptoms worsen or fail to improve.    Thank you, Tiffany HUERTA  [facility]

## 2023-11-30 LAB
COMMENT NL11729A: NORMAL
CYTOLOGIST CVX/VAG CYTO: NORMAL
CYTOLOGY CVX/VAG DOC CYTO: NORMAL
CYTOLOGY CVX/VAG DOC THIN PREP: NORMAL
NOTE NL11727A: NORMAL
OTHER STN SPEC: NORMAL
STAT OF ADQ CVX/VAG CYTO-IMP: NORMAL

## 2024-09-03 DIAGNOSIS — E11.9 TYPE 2 DIABETES MELLITUS WITHOUT COMPLICATION, WITHOUT LONG-TERM CURRENT USE OF INSULIN (HCC): ICD-10-CM

## 2024-09-03 DIAGNOSIS — M1A.0790 CHRONIC GOUT OF FOOT, UNSPECIFIED CAUSE, UNSPECIFIED LATERALITY: ICD-10-CM

## 2024-09-04 RX ORDER — ALLOPURINOL 100 MG/1
100 TABLET ORAL DAILY
Qty: 90 TABLET | Refills: 3 | Status: SHIPPED | OUTPATIENT
Start: 2024-09-04

## 2024-09-04 NOTE — TELEPHONE ENCOUNTER
Received request via: Patient    Was the patient seen in the last year in this department? Yes    Does the patient have an active prescription (recently filled or refills available) for medication(s) requested? No    Pharmacy Name: [id number]     Does the patient have halfway Plus and need 100-day supply? (This applies to ALL medications) Patient does not have SCP

## 2024-10-09 ENCOUNTER — APPOINTMENT (OUTPATIENT)
Dept: MEDICAL GROUP | Facility: IMAGING CENTER | Age: 39
End: 2024-10-09
Payer: COMMERCIAL

## 2024-11-05 ENCOUNTER — APPOINTMENT (OUTPATIENT)
Dept: MEDICAL GROUP | Facility: IMAGING CENTER | Age: 39
End: 2024-11-05
Payer: COMMERCIAL

## 2024-12-10 ENCOUNTER — TELEPHONE (OUTPATIENT)
Dept: MEDICAL GROUP | Facility: IMAGING CENTER | Age: 39
End: 2024-12-10
Payer: COMMERCIAL

## 2024-12-10 NOTE — TELEPHONE ENCOUNTER
LVM to reschedule a patient's appointment on 12/17/2024 with Tiffany Puga. Tiffany Puga will be out-of-office at that time. Patient was instructed to call [phone] or use the [employer] application to reschedule at their earliest convenience.

## 2024-12-11 ENCOUNTER — TELEPHONE (OUTPATIENT)
Dept: MEDICAL GROUP | Facility: IMAGING CENTER | Age: 39
End: 2024-12-11
Payer: COMMERCIAL

## 2024-12-11 NOTE — TELEPHONE ENCOUNTER
LVM to reschedule a patient's appointment on 12/17/2024 with Tiffany Puga .Tiffany Puga  will be out-of-office at that time. Patient was instructed to call [phone] or use [employer] application to reschedule at their earliest convenience.

## 2024-12-17 ENCOUNTER — APPOINTMENT (OUTPATIENT)
Dept: MEDICAL GROUP | Facility: IMAGING CENTER | Age: 39
End: 2024-12-17
Payer: COMMERCIAL

## 2025-03-10 ENCOUNTER — HOSPITAL ENCOUNTER (OUTPATIENT)
Facility: MEDICAL CENTER | Age: 40
End: 2025-03-10
Payer: COMMERCIAL

## 2025-03-10 ENCOUNTER — APPOINTMENT (OUTPATIENT)
Dept: MEDICAL GROUP | Facility: IMAGING CENTER | Age: 40
End: 2025-03-10
Payer: COMMERCIAL

## 2025-03-10 VITALS
RESPIRATION RATE: 16 BRPM | OXYGEN SATURATION: 99 % | TEMPERATURE: 98.3 F | SYSTOLIC BLOOD PRESSURE: 132 MMHG | DIASTOLIC BLOOD PRESSURE: 90 MMHG | HEART RATE: 102 BPM | WEIGHT: 160.1 LBS | HEIGHT: 60 IN | BODY MASS INDEX: 31.43 KG/M2

## 2025-03-10 DIAGNOSIS — E66.811 OBESITY (BMI 30.0-34.9): ICD-10-CM

## 2025-03-10 DIAGNOSIS — E78.5 DYSLIPIDEMIA: ICD-10-CM

## 2025-03-10 DIAGNOSIS — I10 PRIMARY HYPERTENSION: ICD-10-CM

## 2025-03-10 DIAGNOSIS — Z11.4 ENCOUNTER FOR SCREENING FOR HUMAN IMMUNODEFICIENCY VIRUS (HIV): ICD-10-CM

## 2025-03-10 DIAGNOSIS — E11.21 TYPE 2 DIABETES MELLITUS WITH MACROALBUMINURIC DIABETIC NEPHROPATHY (HCC): ICD-10-CM

## 2025-03-10 DIAGNOSIS — M1A.0790 CHRONIC GOUT OF FOOT, UNSPECIFIED CAUSE, UNSPECIFIED LATERALITY: ICD-10-CM

## 2025-03-10 DIAGNOSIS — K76.0 FATTY LIVER: ICD-10-CM

## 2025-03-10 LAB
HBA1C MFR BLD: 6 % (ref ?–5.8)
POCT INT CON NEG: NEGATIVE
POCT INT CON POS: POSITIVE

## 2025-03-10 PROCEDURE — 83036 HEMOGLOBIN GLYCOSYLATED A1C: CPT

## 2025-03-10 PROCEDURE — 3075F SYST BP GE 130 - 139MM HG: CPT

## 2025-03-10 PROCEDURE — 3079F DIAST BP 80-89 MM HG: CPT

## 2025-03-10 PROCEDURE — 99214 OFFICE O/P EST MOD 30 MIN: CPT

## 2025-03-10 PROCEDURE — 82043 UR ALBUMIN QUANTITATIVE: CPT

## 2025-03-10 PROCEDURE — 82570 ASSAY OF URINE CREATININE: CPT

## 2025-03-10 PROCEDURE — 1126F AMNT PAIN NOTED NONE PRSNT: CPT

## 2025-03-10 RX ORDER — ALLOPURINOL 100 MG/1
100 TABLET ORAL DAILY
Qty: 90 TABLET | Refills: 3 | Status: SHIPPED | OUTPATIENT
Start: 2025-03-10

## 2025-03-10 RX ORDER — COLCHICINE 0.6 MG/1
TABLET ORAL
COMMUNITY

## 2025-03-10 RX ORDER — OLMESARTAN MEDOXOMIL 20 MG/1
20 TABLET ORAL DAILY
Qty: 100 TABLET | Refills: 3 | Status: SHIPPED | OUTPATIENT
Start: 2025-03-10 | End: 2026-04-14

## 2025-03-10 ASSESSMENT — PATIENT HEALTH QUESTIONNAIRE - PHQ9: CLINICAL INTERPRETATION OF PHQ2 SCORE: 0

## 2025-03-10 ASSESSMENT — PAIN SCALES - GENERAL: PAINLEVEL_OUTOF10: NO PAIN

## 2025-03-10 NOTE — PROGRESS NOTES
Subjective:     CC:   Chief Complaint   Patient presents with    Follow-Up    Medication Management     Refills        HPI:   Mabel presents today to discuss:    Hypertension  New finding. Patient admits having family history of HTN.   Patient denies blurred blurred, severe headaches, chest pain, chest pressure, dizziness, palpitations, syncope, orthopnea, dyspnea or exertion, or leg swelling.    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  Chronic condition.  Patient currently takes metformin 1000 mg twice daily. She is tolerating medication well without side effects.   She is interested in starting GLP-1 medication for diabetes and weight loss.    Dyslipidemia   Established condition. Not on statins. Last 2 years ago. Pt reports that she is trying to stay active but admits no regular exercise.     Latest Reference Range & Units 11/20/23 10:30   Cholesterol,Tot 100 - 199 mg/dL 229 (H)   Triglycerides 0 - 149 mg/dL 276 (H)   HDL >=40 mg/dL 42   LDL <100 mg/dL 132 (H)     Fatty liver disease  Hepatomegaly  Established condition.  Managed by GI s/p MRI abdomen. Pt continues to f/u as needed and was last seen in office one year ago.     Gout  Chronic condition.  Patient takes allopurinol 100 mg daily and colchicine as needed with flareups.  She reports having 1 flareup in 1 year.  She is tolerating medications well without side effects.  She is requesting med refill.    Past Medical History:   Diagnosis Date    Gallstone     Gout      Family History   Problem Relation Age of Onset    Hyperlipidemia Mother     Hypertension Mother     Thyroid Mother     Gout Brother      No past surgical history on file.  Social History     Tobacco Use    Smoking status: Never    Smokeless tobacco: Never   Vaping Use    Vaping status: Never Used   Substance Use Topics    Alcohol use: Never    Drug use: Never     Social History     Social History Narrative    Not on file     Current Outpatient Medications  Ordered in [facility]   Medication Sig Dispense Refill    colchicine (COLCRYS) 0.6 MG Tab 12      allopurinol (ZYLOPRIM) 100 MG Tab Take 1 Tablet by mouth every day. 90 Tablet 3    metformin (GLUCOPHAGE) 1000 MG tablet Take 1 Tablet by mouth 2 times a day with meals. 180 Tablet 3    olmesartan (BENICAR) 20 MG Tab Take 1 Tablet by mouth every day. 100 Tablet 3     No current Epic-ordered facility-administered medications on file.     Patient has no known allergies.    ROS: see hpi  Gen: no fevers/chills  Pulm: no sob, no cough  CV: no chest pain, no palpitations, no edema  GI: no nausea/vomiting, no diarrhea  Skin: no rash    Objective:   Exam:  BP (!) 132/90   Pulse (!) 102   Temp 36.8 °C (98.3 °F) (Temporal)   Resp 16   Ht 1.524 m (5')   Wt 72.6 kg (160 lb 1.6 oz)   LMP 02/21/2025 (Exact Date)   SpO2 99%   BMI 31.27 kg/m²    Body mass index is 31.27 kg/m².    Gen: Alert and oriented, No apparent distress.  HEENT: Head atraumatic, normocephalic. Pupils equal and round.  Neck: Neck is supple without lymphadenopathy.   Lungs: Normal effort, CTA bilaterally, no wheezes, rhonchi, or rales  CV: Regular rate and rhythm. No murmurs, rubs, or gallops.  ABD: +BS. Non-tender, non-distended. No rebound, rigidity, or guarding.  Ext: No clubbing, cyanosis, edema.    Assessment & Plan:     39 y.o. female with the following -     1. Primary hypertension  New issue.  BP elevated today.  Per mutual decision making, patient agreeable to starting low-dose Benicar 20 mg daily.  Medication administration and side effects discussed.  Recommend to monitor BP and keep log.  Will follow-up in 4 weeks for BP and med check.  ED symptoms discussed.    - CBC WITH DIFFERENTIAL; Future  - Comp Metabolic Panel; Future  - Lipid Profile; Future  - TSH WITH REFLEX TO FT4; Future  - VITAMIN D,25 HYDROXY (DEFICIENCY); Future  - HEMOGLOBIN A1C; Future  - olmesartan (BENICAR) 20 MG Tab; Take 1 Tablet by mouth every day.  Dispense: 100 Tablet; Refill:  3  - MICROALBUMIN CREAT RATIO URINE; Future    2. Type 2 diabetes mellitus with macroalbuminuric diabetic nephropathy (HCC)  Chronic, stable condition on metformin.  A1c 6.0 today. Will continue to monitor.   Diabetes recommendations:  -Follow an 1800 calorie ADA diet and aim to get about half of your calories from carbohydrates. For an 1800 calorie diet, that would be around 150-200 grams of carbs a day. Exercise as tolerated; ideally 150 minutes of cardiovascular exercise a week, or 20 minutes a day.   -Monitor blood sugars at home and keep a log book. Check your glucose fasting every morning, before dinner, and 2 hours after dinner (or as otherwise recommended at your appointment). Call if glucose <70 or >300. Please send weekly readings of your blood glucose through [employer].  -Please check your feet frequently for any open wounds/ulcers, signs of infection, or changes in sensation/neuropathy.  -Ensure that you see your ophthalmologist for your annual eye exam to assess for diabetic retinopathy.    Pt interested in GLP-1 for DM and weight loss for obesity. Will order labs and f/u prior to starting medication.     - metformin (GLUCOPHAGE) 1000 MG tablet; Take 1 Tablet by mouth 2 times a day with meals.  Dispense: 180 Tablet; Refill: 3  - CBC WITH DIFFERENTIAL; Future  - Comp Metabolic Panel; Future  - Lipid Profile; Future  - TSH WITH REFLEX TO FT4; Future  - VITAMIN D,25 HYDROXY (DEFICIENCY); Future  - HEMOGLOBIN A1C; Future  - POCT Hemoglobin A1C  - MICROALBUMIN CREAT RATIO URINE; Future    3. Obesity (BMI 30.0-34.9)  Discussed lifestyle and dietary changes such as low-carb diet, high in vegetables and fresh fruits.  Encouraged to increase water intake.  Regular physical exercise at least 30 minutes/day 5 days a week. Weight reduction if applicable (aim for 5% to 10% body weight increments).     - CBC WITH DIFFERENTIAL; Future  - Comp Metabolic Panel; Future  - Lipid Profile; Future  - TSH WITH REFLEX TO FT4;  Future  - VITAMIN D,25 HYDROXY (DEFICIENCY); Future  - HEMOGLOBIN A1C; Future    4. Chronic gout of foot, unspecified cause, unspecified laterality  Chronic and stable condition on allopurinol 100 mg daily.  Med refill sent to pharmacy.  Recommend increase fluid intake and avoid high purine foods.  Will order labs to monitor.    - allopurinol (ZYLOPRIM) 100 MG Tab; Take 1 Tablet by mouth every day.  Dispense: 90 Tablet; Refill: 3  - CBC WITH DIFFERENTIAL; Future  - URIC ACID; Future    5. Dyslipidemia  Chronic condition.  Patient due to repeat lipid panel.  I recommend healthy lifestyle changes that include diet low in saturated fat, limit simple sugars, simple carbs, food with high fructose corn syrup, and highly processed food; eat lean protein, diet high in fresh vegetables, fruits, and fiber; exercise 30 min per day 5 times a week, avoid alcohol, stop smoking, practice stress reduction techniques, and good sleep hygiene. Weight loss of 5-10% to achieve healthy BMI. I also recommend start taking omega 3 fatty acid daily supplementation.     - CBC WITH DIFFERENTIAL; Future  - Comp Metabolic Panel; Future  - Lipid Profile; Future    6. Fatty liver  Chronic condition.  Will repeat lipid panel to monitor.  Recommend to follow-up with GI as scheduled.  Recommend lifestyle changes above.     - CBC WITH DIFFERENTIAL; Future  - Comp Metabolic Panel; Future  - Lipid Profile; Future    7. Encounter for screening for human immunodeficiency virus (HIV)    - HIV AG/AB COMBO ASSAY SCREENING; Future    Return in about 4 weeks (around 4/7/2025), or if symptoms worsen or fail to improve, for annual, HTN f/u, med check.    GUSTAVO Herrera.   [facility]    Medical Decision Making/Course:  In the course of preparing for this visit with review of the pertinent past medical history, recent and past clinic visits, current medications, and performing chart, immunization, medical history and medication  reconciliation, and in the further course of obtaining the current history pertinent to the clinic visit today, performing an exam and evaluation, ordering and independently evaluating labs, tests, and/or procedures, prescribing any recommended new medications as noted above, providing any pertinent counseling and education and recommending further coordination of care. This was discussed with patient in a shared-decision making conversation, and they understand and agreed with plan of care.     Please note that this dictation was created using voice recognition software. I have made every reasonable attempt to correct obvious errors, but I expect that there are errors of grammar and possibly content that I did not discover before finalizing the note.

## 2025-03-11 LAB
CREAT UR-MCNC: 36.1 MG/DL
MICROALBUMIN UR-MCNC: 1.3 MG/DL
MICROALBUMIN/CREAT UR: 36 MG/G (ref 0–30)

## 2025-04-01 ENCOUNTER — RESULTS FOLLOW-UP (OUTPATIENT)
Dept: MEDICAL GROUP | Facility: IMAGING CENTER | Age: 40
End: 2025-04-01
Payer: COMMERCIAL

## 2025-04-01 ENCOUNTER — HOSPITAL ENCOUNTER (OUTPATIENT)
Dept: LAB | Facility: MEDICAL CENTER | Age: 40
End: 2025-04-01
Payer: COMMERCIAL

## 2025-04-01 DIAGNOSIS — M1A.0790 CHRONIC GOUT OF FOOT, UNSPECIFIED CAUSE, UNSPECIFIED LATERALITY: ICD-10-CM

## 2025-04-01 DIAGNOSIS — Z11.4 ENCOUNTER FOR SCREENING FOR HUMAN IMMUNODEFICIENCY VIRUS (HIV): ICD-10-CM

## 2025-04-01 DIAGNOSIS — E66.811 OBESITY (BMI 30.0-34.9): ICD-10-CM

## 2025-04-01 DIAGNOSIS — E11.21 TYPE 2 DIABETES MELLITUS WITH MACROALBUMINURIC DIABETIC NEPHROPATHY (HCC): ICD-10-CM

## 2025-04-01 DIAGNOSIS — E78.5 DYSLIPIDEMIA: ICD-10-CM

## 2025-04-01 DIAGNOSIS — K76.0 FATTY LIVER: ICD-10-CM

## 2025-04-01 DIAGNOSIS — I10 PRIMARY HYPERTENSION: ICD-10-CM

## 2025-04-01 LAB
25(OH)D3 SERPL-MCNC: 21 NG/ML (ref 30–100)
ALBUMIN SERPL BCP-MCNC: 4.6 G/DL (ref 3.2–4.9)
ALBUMIN/GLOB SERPL: 1.4 G/DL
ALP SERPL-CCNC: 114 U/L (ref 30–99)
ALT SERPL-CCNC: 33 U/L (ref 2–50)
ANION GAP SERPL CALC-SCNC: 13 MMOL/L (ref 7–16)
AST SERPL-CCNC: 23 U/L (ref 12–45)
BASOPHILS # BLD AUTO: 0.5 % (ref 0–1.8)
BASOPHILS # BLD: 0.04 K/UL (ref 0–0.12)
BILIRUB SERPL-MCNC: 0.3 MG/DL (ref 0.1–1.5)
BUN SERPL-MCNC: 8 MG/DL (ref 8–22)
CALCIUM ALBUM COR SERPL-MCNC: 9.3 MG/DL (ref 8.5–10.5)
CALCIUM SERPL-MCNC: 9.8 MG/DL (ref 8.5–10.5)
CHLORIDE SERPL-SCNC: 103 MMOL/L (ref 96–112)
CHOLEST SERPL-MCNC: 219 MG/DL (ref 100–199)
CO2 SERPL-SCNC: 23 MMOL/L (ref 20–33)
CREAT SERPL-MCNC: 0.7 MG/DL (ref 0.5–1.4)
EOSINOPHIL # BLD AUTO: 0.13 K/UL (ref 0–0.51)
EOSINOPHIL NFR BLD: 1.7 % (ref 0–6.9)
ERYTHROCYTE [DISTWIDTH] IN BLOOD BY AUTOMATED COUNT: 35.5 FL (ref 35.9–50)
EST. AVERAGE GLUCOSE BLD GHB EST-MCNC: 131 MG/DL
GFR SERPLBLD CREATININE-BSD FMLA CKD-EPI: 113 ML/MIN/1.73 M 2
GLOBULIN SER CALC-MCNC: 3.3 G/DL (ref 1.9–3.5)
GLUCOSE SERPL-MCNC: 127 MG/DL (ref 65–99)
HBA1C MFR BLD: 6.2 % (ref 4–5.6)
HCT VFR BLD AUTO: 42.1 % (ref 37–47)
HDLC SERPL-MCNC: 43 MG/DL
HGB BLD-MCNC: 12.9 G/DL (ref 12–16)
HIV 1+2 AB+HIV1 P24 AG SERPL QL IA: NORMAL
IMM GRANULOCYTES # BLD AUTO: 0.02 K/UL (ref 0–0.11)
IMM GRANULOCYTES NFR BLD AUTO: 0.3 % (ref 0–0.9)
LDLC SERPL CALC-MCNC: 132 MG/DL
LYMPHOCYTES # BLD AUTO: 1.97 K/UL (ref 1–4.8)
LYMPHOCYTES NFR BLD: 26.3 % (ref 22–41)
MCH RBC QN AUTO: 22.2 PG (ref 27–33)
MCHC RBC AUTO-ENTMCNC: 30.6 G/DL (ref 32.2–35.5)
MCV RBC AUTO: 72.3 FL (ref 81.4–97.8)
MONOCYTES # BLD AUTO: 0.35 K/UL (ref 0–0.85)
MONOCYTES NFR BLD AUTO: 4.7 % (ref 0–13.4)
NEUTROPHILS # BLD AUTO: 4.97 K/UL (ref 1.82–7.42)
NEUTROPHILS NFR BLD: 66.5 % (ref 44–72)
NRBC # BLD AUTO: 0 K/UL
NRBC BLD-RTO: 0 /100 WBC (ref 0–0.2)
PLATELET # BLD AUTO: 343 K/UL (ref 164–446)
PMV BLD AUTO: 10.4 FL (ref 9–12.9)
POTASSIUM SERPL-SCNC: 4.7 MMOL/L (ref 3.6–5.5)
PROT SERPL-MCNC: 7.9 G/DL (ref 6–8.2)
RBC # BLD AUTO: 5.82 M/UL (ref 4.2–5.4)
SODIUM SERPL-SCNC: 139 MMOL/L (ref 135–145)
TRIGL SERPL-MCNC: 219 MG/DL (ref 0–149)
TSH SERPL DL<=0.005 MIU/L-ACNC: 1.87 UIU/ML (ref 0.38–5.33)
URATE SERPL-MCNC: 6.7 MG/DL (ref 1.9–8.2)
WBC # BLD AUTO: 7.5 K/UL (ref 4.8–10.8)

## 2025-04-01 PROCEDURE — 84550 ASSAY OF BLOOD/URIC ACID: CPT

## 2025-04-01 PROCEDURE — 83036 HEMOGLOBIN GLYCOSYLATED A1C: CPT

## 2025-04-01 PROCEDURE — 84443 ASSAY THYROID STIM HORMONE: CPT

## 2025-04-01 PROCEDURE — 87389 HIV-1 AG W/HIV-1&-2 AB AG IA: CPT

## 2025-04-01 PROCEDURE — 80061 LIPID PANEL: CPT

## 2025-04-01 PROCEDURE — 85025 COMPLETE CBC W/AUTO DIFF WBC: CPT

## 2025-04-01 PROCEDURE — 80053 COMPREHEN METABOLIC PANEL: CPT

## 2025-04-01 PROCEDURE — 36415 COLL VENOUS BLD VENIPUNCTURE: CPT

## 2025-04-01 PROCEDURE — 82306 VITAMIN D 25 HYDROXY: CPT

## 2025-04-08 ENCOUNTER — OFFICE VISIT (OUTPATIENT)
Dept: MEDICAL GROUP | Facility: IMAGING CENTER | Age: 40
End: 2025-04-08
Payer: COMMERCIAL

## 2025-04-08 VITALS
DIASTOLIC BLOOD PRESSURE: 82 MMHG | OXYGEN SATURATION: 98 % | TEMPERATURE: 98 F | HEIGHT: 60 IN | RESPIRATION RATE: 16 BRPM | WEIGHT: 163.2 LBS | BODY MASS INDEX: 32.04 KG/M2 | SYSTOLIC BLOOD PRESSURE: 124 MMHG | HEART RATE: 98 BPM

## 2025-04-08 DIAGNOSIS — E66.811 OBESITY (BMI 30.0-34.9): ICD-10-CM

## 2025-04-08 DIAGNOSIS — E78.2 MIXED HYPERLIPIDEMIA: ICD-10-CM

## 2025-04-08 DIAGNOSIS — E55.9 VITAMIN D DEFICIENCY: ICD-10-CM

## 2025-04-08 DIAGNOSIS — Z00.00 ANNUAL PHYSICAL EXAM: ICD-10-CM

## 2025-04-08 DIAGNOSIS — E11.9 TYPE 2 DIABETES MELLITUS WITHOUT COMPLICATION, WITHOUT LONG-TERM CURRENT USE OF INSULIN (HCC): ICD-10-CM

## 2025-04-08 PROCEDURE — 3074F SYST BP LT 130 MM HG: CPT

## 2025-04-08 PROCEDURE — 99214 OFFICE O/P EST MOD 30 MIN: CPT | Mod: 25

## 2025-04-08 PROCEDURE — 99385 PREV VISIT NEW AGE 18-39: CPT

## 2025-04-08 PROCEDURE — 3079F DIAST BP 80-89 MM HG: CPT

## 2025-04-08 PROCEDURE — 1126F AMNT PAIN NOTED NONE PRSNT: CPT

## 2025-04-08 ASSESSMENT — FIBROSIS 4 INDEX: FIB4 SCORE: 0.44

## 2025-04-08 ASSESSMENT — PAIN SCALES - GENERAL: PAINLEVEL_OUTOF10: NO PAIN

## 2025-04-08 NOTE — PROGRESS NOTES
Subjective:     CC:   Chief Complaint   Patient presents with    Annual Exam       HPI:   Mabel Roque is a 39 y.o. female who presents for annual exam. She is feeling well and denies any complaints.    Hypertension  Established condition. Pt takes olmesartan 20 mg daily.  Tolerating medications well without side effects.  Patient denies blurred blurred, severe headaches, chest pain, chest pressure, dizziness, palpitations, syncope, orthopnea, dyspnea or exertion, or leg swelling.     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  Obesity BMI 30-34.9  Chronic condition.  Patient currently takes metformin 1000 mg twice daily. She is tolerating medication well without side effects.   She is interested in starting GLP-1 medication for diabetes and weight loss.  She is working on lifestyle changes: keto diet and cardio exercise 3x/week   Latest Reference Range & Units 04/01/25 10:21   Glycohemoglobin 4.0 - 5.6 % 6.2 (H)      Latest Reference Range & Units 04/01/25 10:21   Glucose 65 - 99 mg/dL 127 (H)   (H): Data is abnormally high    Dyslipidemia   Established condition. Not on statins. She is working on lifestyle changes.  Patient started more keto diet, less carbs.  Patient does cardio exercise 3 times a week.      Latest Reference Range & Units 11/20/23 10:30 04/01/25 10:21   Cholesterol,Tot 100 - 199 mg/dL 229 (H) 219 (H)   Triglycerides 0 - 149 mg/dL 276 (H) 219 (H)   HDL >=40 mg/dL 42 43   LDL <100 mg/dL 132 (H) 132 (H)   (H): Data is abnormally high          Gout  Chronic condition.  Patient takes allopurinol 100 mg daily and colchicine as needed with flareups.  She reports having 1 flareup in 1 year.  She is tolerating medications well without side effects.  She is requesting med refill.    Vitamin D insufficiency  Not taking any supplements     Latest Reference Range & Units 04/01/25 10:21   25-Hydroxy   Vitamin D 25 30 - 100 ng/mL 21 (L)   (L): Data is abnormally  low    Ob-Gyn/ History:    Patient has GYN provider: no  /Para:  0  Last Pap Smear: 3/23/2025. no history of abnormal pap smears.  Gyn Surgery:  0.  Current Contraceptive Method:  no. yes currently sexually active.  Last menstrual period:  .  Periods regular. moderate bleeding. Cramping is moderate.   She does take OTC analgesics for cramps.  No significant bloating/fluid retention, pelvic pain, or dyspareunia. No vaginal discharge  Post-menopausal bleeding: no  Urinary incontinence: no  Folate intake:no     Health Maintenance  Cholesterol Screening: up to date   Diabetes Screening: up to date   Diet: well-balanced, admits consistency is an issue but is working on it  Exercise: gym 3x a week   Substance Abuse: no   Safe in relationship. Yes   Seat belts, bike helmet, gun safety discussed.  Sun protection used.  Dentist: yes  Eye Doctor: yes     Cancer screening  Cervical Cancer Screenin2023, due to repeat in      Infectious disease screening/Immunizations  --Practices safe sex.  --HIV Screening: completed  --Hepatitis C Screening: completed  --Immunizations: decline                She  has a past medical history of Gallstone and Gout.  She  has no past surgical history on file.    Family History   Problem Relation Age of Onset    Hyperlipidemia Mother     Hypertension Mother     Thyroid Mother     Gout Brother        Social History     Socioeconomic History    Marital status: Single     Spouse name: Not on file    Number of children: Not on file    Years of education: Not on file    Highest education level: Not on file   Occupational History    Not on file   Tobacco Use    Smoking status: Never    Smokeless tobacco: Never   Vaping Use    Vaping status: Never Used   Substance and Sexual Activity    Alcohol use: Never    Drug use: Never    Sexual activity: Yes     Partners: Male     Birth control/protection: Condom Male   Other Topics Concern    Not on file   Social History Narrative    Not  on file     Social Drivers of Health     Financial Resource Strain: Not on file   Food Insecurity: Not on file   Transportation Needs: Not on file   Physical Activity: Not on file   Stress: Not on file   Social Connections: Not on file   Intimate Partner Violence: Not on file   Housing Stability: Not on file       Patient Active Problem List    Diagnosis Date Noted    Fatty liver 03/10/2025    Elevated blood pressure reading 11/29/2022    Type 2 diabetes mellitus without complication, without long-term current use of insulin (HCC) 11/14/2022    Dyslipidemia 11/14/2022    Hepatomegaly 11/14/2022    Microcytosis 11/14/2022    Obesity (BMI 30.0-34.9) 11/14/2022    Chronic gout of foot 09/26/2022         Current Outpatient Medications   Medication Sig Dispense Refill    Semaglutide,0.25 or 0.5MG/DOS, 2 MG/3ML Solution Pen-injector Inject 0.25 mg under the skin every 7 days. 3 mL 11    colchicine (COLCRYS) 0.6 MG Tab 12      allopurinol (ZYLOPRIM) 100 MG Tab Take 1 Tablet by mouth every day. 90 Tablet 3    metformin (GLUCOPHAGE) 1000 MG tablet Take 1 Tablet by mouth 2 times a day with meals. 180 Tablet 3    olmesartan (BENICAR) 20 MG Tab Take 1 Tablet by mouth every day. 100 Tablet 3     No current facility-administered medications for this visit.     No Known Allergies    Review of Systems   Constitutional: Negative for fever, chills and malaise/fatigue.   HENT: Negative for congestion.    Eyes: Negative for pain.    Respiratory: Negative for cough and shortness of breath.  Cardiovascular: Negative for leg swelling.   Gastrointestinal: Negative for nausea, vomiting, abdominal pain and diarrhea.   Genitourinary: Negative for dysuria and hematuria.   Skin: Negative for rash.   Neurological: Negative for dizziness, focal weakness and headaches.   Endo/Heme/Allergies: Does not bleed easily.   Psychiatric/Behavioral: Negative for depression.  The patient is not nervous/anxious.      Objective:     /82 (BP Location:  Right arm, Patient Position: Sitting, BP Cuff Size: Adult)   Pulse 98   Temp 36.7 °C (98 °F) (Temporal)   Resp 16   Ht 1.524 m (5')   Wt 74 kg (163 lb 3.2 oz)   LMP 03/23/2025 (Exact Date)   SpO2 98%   BMI 31.87 kg/m²   Body mass index is 31.87 kg/m².  Wt Readings from Last 4 Encounters:   04/08/25 74 kg (163 lb 3.2 oz)   03/10/25 72.6 kg (160 lb 1.6 oz)   11/28/23 74 kg (163 lb 3.2 oz)   09/14/23 75.5 kg (166 lb 6.4 oz)       Physical Exam:  Constitutional: Well-developed and well-nourished. Not diaphoretic. No distress.   Skin: Skin is warm and dry. No rash noted.  Head: Atraumatic without lesions.  Eyes: Conjunctivae and extraocular motions are normal. Pupils are equal, round, and reactive to light. No scleral icterus.   Ears:  External ears unremarkable. Tympanic membranes clear and intact.  Nose: Nares patent. Septum midline. Turbinates without erythema nor edema. No discharge.   Mouth/Throat:   Neck: Supple, trachea midline. Normal range of motion. No thyromegaly present. No lymphadenopathy--cervical or supraclavicular.  Cardiovascular: Regular rate and rhythm, S1 and S2 without murmur, rubs, or gallops.  Lungs: Normal inspiratory effort, CTA bilaterally, no wheezes/rhonchi/rales  Abdomen: Soft, non tender, and without distention. Active bowel sounds in all four quadrants. No rebound, guarding, masses or HSM.  Extremities: No cyanosis, clubbing, erythema, nor edema. Distal pulses intact and symmetric.   Musculoskeletal: All major joints AROM full in all directions without pain.  Neurological: Alert and oriented x 3. DTRs 2+/3 and symmetric. No cranial nerve deficit. 5/5 myotomes. Sensation intact.   Psychiatric:  Behavior, mood, and affect are appropriate.    Monofilament testing with a 10 gram force: sensation intact: intact bilaterally  Visual Inspection: Feet without maceration, ulcers, fissures.  Pedal pulses: intact bilaterally    Assessment and Plan:     1. Annual physical exam  PMH/PSH/FH/Social  history reviewed. Medication reconciled. Vaccinations discussed. Previous records and labs reviewed. Discussed age appropriate anticipatory guidance.    2. Type 2 diabetes mellitus without complication, without long-term current use of insulin (HCC)  Chronic, partially controlled on metformin.  Will continue metformin 1000 mg twice daily.  Will add semaglutide 0.25 mg weekly for DM control and weight loss.  Discussed in detail medication's mechanism of action, benefits, side effects such as nausea, vomiting, diarrhea, stomach pain, and constipation, and how to administer.   Discussed in detail and counseled patient on medication's potential risk for thyroid tumors, thyroid cancers, pancreatitis, gastric paralysis, gallbladder disorder, and muscle loss.  Instructed patient to monitor for signs of thyroid tumor such as mass in neck, difficulty swallowing, persistent hoarseness, and dyspnea.  Routine monitoring of serum calcitonin or thyroid ultrasound will be done during the duration of Ozempic therapy for surveillance. Provided patient with medication information handout. Advised patient to drink plenty of water while on this medication and eat small portions of food.  Increase protein intake and start strength training exercises 3 times a week to prevent muscle loss.  Avoid drinking alcohol while on this medication.   Discussed tapering up dose until A1c and goal weight is achieved.   Will f/u in 4 weeks for med and weigth check.    - POCT Retinal Eye Exam  - Diabetic Monofilament LE Exam  - HEMOGLOBIN A1C; Future  - Semaglutide,0.25 or 0.5MG/DOS, 2 MG/3ML Solution Pen-injector; Inject 0.25 mg under the skin every 7 days.  Dispense: 3 mL; Refill: 11    3. Obesity (BMI 30.0-34.9)  See plan above    - Semaglutide,0.25 or 0.5MG/DOS, 2 MG/3ML Solution Pen-injector; Inject 0.25 mg under the skin every 7 days.  Dispense: 3 mL; Refill: 11    4. Mixed hyperlipidemia  Chronic, ongoing issue.   Obesity is a disease associated  with a significant increase in mortality and many health risks, including type 2 diabetes mellitus, hypertension, dyslipidemia, and coronary heart disease, sleep apnea, depression, quality of life, physical functioning, and mobility.     Pure hypercholesterolemia increases patient's risk for heart attacks and strokes.     Semaglutide has shown substantial efficacy for weight reduction in individuals with obesity as well as reduction in risk factors for cardiovascular disease reduce major cardiovascular events in adults.     Will start semaglutide 0.25 mg weekly.  Will follow-up in 4 weeks for med check.  Will repeat lipid panel in 3 months.    - Lipid Profile; Future  - Semaglutide,0.25 or 0.5MG/DOS, 2 MG/3ML Solution Pen-injector; Inject 0.25 mg under the skin every 7 days.  Dispense: 3 mL; Refill: 11    5. Vitamin D deficiency  I recommend taking an over the counter Vitamin D3/K2 5000 IU/100 mcg daily supplementation.   Please be sure to take the vitamin D supplement with a fatty meal to help with absorption. Please be sure that you are also taking in Calcium 1,000 mg daily which can be obtained through a healthy diet rich in lean proteins, vegetables, fruits, low fat milk, yogurt, and cheese.   I also recommend 10-15 minutes of sun exposure to active Vitamin D.  Will repeat vit d in 3 months.    - VITAMIN D,25 HYDROXY (DEFICIENCY); Future      HCM:  completed   Labs per orders  Immunizations per orders  Patient counseled about skin care, diet, supplements, prenatal vitamins, safe sex and exercise.    -Smoking cessation discussed if smoking and encouraged to come to office if quit and tempted or restarts smoking if pertinent to this patient. We discourage use of electronic smoking devises.   -Discussed healthy drinking habits if over 7 for females, 14 for males per week or more than 4 in one day.  -Discussed healthy eating habits, exercise, being physically active, healthy bmi below 25  -patient to provide ages of  family members when they were diagnosed with cancer , especially breast and ovarian, pancreatic cancers.  -Reviewed pap history in female patients, if they have a gynecologist they are encouraged to follow up with that doctor for annual pelvic and breast exams. If they prefer to see me for women's health, I will perform pap smear with hpv dna testing, pelvic, and breast exam, these and male genital exams are always done in the presence of a medical assistant and with verbal permission from the patient.   -Colonoscopy history reviewed with those over 46yo or those with early family h/o colon cancer. If patient is due we provide them with various colon cancer screen modalities and relevant information to help the patient decide which is best for them.   -Mammograms recommended yearly for women over 41yo. Risks and benefits are reviewed and discussed with the patient and mammogram script provided.   -PSA discussed with males with family history of prostate cancer or those concerned. The decision to order this test is made with the patient.   -If patient prescribed medicines then told to review package insert for any warnings, side effects, contra-indications and medication vs medication reactions.   -STD testing added to lab work due to patients age per guideline recommendations  -Patients screened for anxiety and depression. If positive screening patients are offered behavioral health services, medications, and tools to improve mood.   There are no diagnoses linked to this encounter.  -to improve bone health take calcium and vitamin D, perform weight-bearing exercise, in addition we can discuss additional medications if needed including bisphosphonates, parathyroid hormone, and raloxifene.  Esophageal irritation can occur with bisphosphonate therapy this can be reduced by not laying down for 30 min after taking and taking with a full glass of water  -if wearing nail polish on toes or hands asked to rto if there are  any dark brown or black areas under the nails  If lab tests ordered, then patient instructed to go to lab/location/plan  If imaging tests ordered, then patient instructed to go to radiology/location/plan  If medicines ordered, then patient instructed to go to pharmacy/location/plan  Health maintenance I reviewed both men and women's health maintenance  Leading causes of death are motor vehicle accidents, cardiovascular disease, malignant tumors, and HIV.   Breast and ovarian cancer mutation screening was suggested if there was an increased risk for the patient based on devyn scoring.   -A general visit to see the eye doctor every one to two years was thought appropriate. Dentist ever 6-12 months.  -Immunization suggestions: Tetanus shot every 10 years, Influenza immunization pneumococcal- anyone with chronic illnesses     Medical Decision Making/Course:  In the course of preparing for this visit with review of the pertinent past medical history, recent and past clinic visits, current medications, and performing chart, immunization, medical history and medication reconciliation, and in the further course of obtaining the current history pertinent to the clinic visit today, performing an exam and evaluation, ordering and independently evaluating labs, tests, and/or procedures, prescribing any recommended new medications as noted above, providing any pertinent counseling and education and recommending further coordination of care. This was discussed with patient in a shared-decision making conversation, and they understand and agreed with plan of care.       Follow-up: Return in about 4 weeks (around 5/6/2025) for med check.    Thank you, Tiffany HUERTA  [facility]

## 2025-05-13 ENCOUNTER — OFFICE VISIT (OUTPATIENT)
Dept: MEDICAL GROUP | Facility: IMAGING CENTER | Age: 40
End: 2025-05-13
Payer: COMMERCIAL

## 2025-05-13 VITALS
DIASTOLIC BLOOD PRESSURE: 70 MMHG | WEIGHT: 158.6 LBS | HEART RATE: 102 BPM | TEMPERATURE: 97.6 F | HEIGHT: 60 IN | BODY MASS INDEX: 31.14 KG/M2 | OXYGEN SATURATION: 98 % | SYSTOLIC BLOOD PRESSURE: 118 MMHG | RESPIRATION RATE: 16 BRPM

## 2025-05-13 DIAGNOSIS — E11.9 TYPE 2 DIABETES MELLITUS WITHOUT COMPLICATION, WITHOUT LONG-TERM CURRENT USE OF INSULIN (HCC): ICD-10-CM

## 2025-05-13 DIAGNOSIS — E11.69 DYSLIPIDEMIA ASSOCIATED WITH TYPE 2 DIABETES MELLITUS (HCC): ICD-10-CM

## 2025-05-13 DIAGNOSIS — E78.5 DYSLIPIDEMIA ASSOCIATED WITH TYPE 2 DIABETES MELLITUS (HCC): ICD-10-CM

## 2025-05-13 DIAGNOSIS — E66.811 OBESITY (BMI 30.0-34.9): ICD-10-CM

## 2025-05-13 PROCEDURE — 99214 OFFICE O/P EST MOD 30 MIN: CPT

## 2025-05-13 PROCEDURE — 3074F SYST BP LT 130 MM HG: CPT

## 2025-05-13 PROCEDURE — 3078F DIAST BP <80 MM HG: CPT

## 2025-05-13 PROCEDURE — 1126F AMNT PAIN NOTED NONE PRSNT: CPT

## 2025-05-13 RX ORDER — SEMAGLUTIDE 1.34 MG/ML
0.5 INJECTION, SOLUTION SUBCUTANEOUS
Qty: 1.5 ML | Refills: 1 | Status: SHIPPED | OUTPATIENT
Start: 2025-05-13

## 2025-05-13 ASSESSMENT — PAIN SCALES - GENERAL: PAINLEVEL_OUTOF10: NO PAIN

## 2025-05-13 ASSESSMENT — FIBROSIS 4 INDEX: FIB4 SCORE: 0.44

## 2025-05-13 NOTE — PROGRESS NOTES
Subjective:     CC:   Chief Complaint   Patient presents with    Medication Follow-up     Ozempic, working well       HPI:   Mabel presents today to discuss:    Type 2 diabetes mellitus without complications, without long-term current use of insulin  Obesity BMI 30-34.9  Weight loss management  Chronic condition.  Patient on metformin 1000 mg twice daily.  She started semaglutide 0.25 mg weekly 1 month ago.  She is tolerating medications well without side effects.  She has had successful 5 pound loss since starting Ozempic.  She is increasing her protein intake and started strength pilates 3 x a week.       Past Medical History:   Diagnosis Date    Gallstone     Gout      Family History   Problem Relation Age of Onset    Hyperlipidemia Mother     Hypertension Mother     Thyroid Mother     Gout Brother      History reviewed. No pertinent surgical history.  Social History     Tobacco Use    Smoking status: Never    Smokeless tobacco: Never   Vaping Use    Vaping status: Never Used   Substance Use Topics    Alcohol use: Never    Drug use: Never     Social History     Social History Narrative    Not on file     Current Outpatient Medications Ordered in Epic   Medication Sig Dispense Refill    Semaglutide,0.25 or 0.5MG/DOS, (OZEMPIC, 0.25 OR 0.5 MG/DOSE,) 2 MG/1.5ML Solution Pen-injector Inject 0.5 mg under the skin every 7 days. 1.5 mL 1    colchicine (COLCRYS) 0.6 MG Tab 12      allopurinol (ZYLOPRIM) 100 MG Tab Take 1 Tablet by mouth every day. 90 Tablet 3    metformin (GLUCOPHAGE) 1000 MG tablet Take 1 Tablet by mouth 2 times a day with meals. 180 Tablet 3    olmesartan (BENICAR) 20 MG Tab Take 1 Tablet by mouth every day. 100 Tablet 3     No current Epic-ordered facility-administered medications on file.     Patient has no known allergies.      ROS: see hpi  Gen: no fevers/chills  Pulm: no sob, no cough  CV: no chest pain, no palpitations, no edema  GI: no nausea/vomiting, no diarrhea  Skin: no  rash    Objective:   Exam:  /70 (BP Location: Right arm, Patient Position: Sitting, BP Cuff Size: Adult)   Pulse (!) 102   Temp 36.4 °C (97.6 °F) (Temporal)   Resp 16   Ht 1.524 m (5')   Wt 71.9 kg (158 lb 9.6 oz)   LMP 04/24/2025 (Exact Date)   SpO2 98%   BMI 30.97 kg/m²    Body mass index is 30.97 kg/m².    Gen: Alert and oriented, No apparent distress.  HEENT: Head atraumatic, normocephalic. Pupils equal and round.  Neck: Neck is supple without lymphadenopathy.   Lungs: Normal effort, CTA bilaterally, no wheezes, rhonchi, or rales  CV: Regular rate and rhythm. No murmurs, rubs, or gallops.  ABD: +BS. Non-tender, non-distended. No rebound, rigidity, or guarding.  Ext: No clubbing, cyanosis, edema.    Assessment & Plan:     39 y.o. female with the following -     1. Type 2 diabetes mellitus without complication, without long-term current use of insulin (HCC)  2. Obesity (BMI 30.0-34.9)  Chronic, stable condition.  Patient has lost 5 pounds since starting Ozempic 1 month ago.  Will increase dose to 0.5 mg weekly.  Will continue to taper dose based on weight loss and A1c level.  Recommend to continue with healthy lifestyle modification.  Will repeat labs and follow-up in July.    - Semaglutide,0.25 or 0.5MG/DOS, (OZEMPIC, 0.25 OR 0.5 MG/DOSE,) 2 MG/1.5ML Solution Pen-injector; Inject 0.5 mg under the skin every 7 days.  Dispense: 1.5 mL; Refill: 1    3. Dyslipidemia associated with type 2 diabetes mellitus (HCC)  Uncontrolled condition.  Discussed LDL goal <70  Will increase semaglutide dose.  Patient will continue with lifestyle modification.  Will repeat lipid panel and follow-up in July.  If LDL remains elevated, will start statins to achieve LDL goal of <70, patient agreeable to this.    - Semaglutide,0.25 or 0.5MG/DOS, (OZEMPIC, 0.25 OR 0.5 MG/DOSE,) 2 MG/1.5ML Solution Pen-injector; Inject 0.5 mg under the skin every 7 days.  Dispense: 1.5 mL; Refill: 1      Return in about 2 months (around  7/13/2025).    BETH Herrera   [facility]    Medical Decision Making/Course:  In the course of preparing for this visit with review of the pertinent past medical history, recent and past clinic visits, current medications, and performing chart, immunization, medical history and medication reconciliation, and in the further course of obtaining the current history pertinent to the clinic visit today, performing an exam and evaluation, ordering and independently evaluating labs, tests, and/or procedures, prescribing any recommended new medications as noted above, providing any pertinent counseling and education and recommending further coordination of care. This was discussed with patient in a shared-decision making conversation, and they understand and agreed with plan of care.     Please note that this dictation was created using voice recognition software. I have made every reasonable attempt to correct obvious errors, but I expect that there are errors of grammar and possibly content that I did not discover before finalizing the note.

## 2025-06-03 ENCOUNTER — RESULTS FOLLOW-UP (OUTPATIENT)
Dept: MEDICAL GROUP | Facility: IMAGING CENTER | Age: 40
End: 2025-06-03
Payer: COMMERCIAL

## 2025-07-10 DIAGNOSIS — E78.5 DYSLIPIDEMIA ASSOCIATED WITH TYPE 2 DIABETES MELLITUS (HCC): ICD-10-CM

## 2025-07-10 DIAGNOSIS — E11.69 DYSLIPIDEMIA ASSOCIATED WITH TYPE 2 DIABETES MELLITUS (HCC): ICD-10-CM

## 2025-07-10 DIAGNOSIS — E11.9 TYPE 2 DIABETES MELLITUS WITHOUT COMPLICATION, WITHOUT LONG-TERM CURRENT USE OF INSULIN (HCC): ICD-10-CM

## 2025-07-10 DIAGNOSIS — E66.811 OBESITY (BMI 30.0-34.9): ICD-10-CM

## 2025-07-11 NOTE — TELEPHONE ENCOUNTER
Received request via: Patient    Was the patient seen in the last year in this department? Yes    Does the patient have an active prescription (recently filled or refills available) for medication(s) requested? No    Pharmacy Name: [id number]    Does the patient have alf Plus and need 100-day supply? (This applies to ALL medications) Patient does not have SCP    Patient comment: Will Allen! I only have 1 dose left that I'm scheduled to take on Monday (7/14). Can I please have 1 refill and discuss more refills later this month during my next appointment? Thank you!

## 2025-07-14 RX ORDER — SEMAGLUTIDE 1.34 MG/ML
0.5 INJECTION, SOLUTION SUBCUTANEOUS
Qty: 1.5 ML | Refills: 1 | Status: SHIPPED | OUTPATIENT
Start: 2025-07-14 | End: 2025-07-24

## 2025-07-21 ENCOUNTER — HOSPITAL ENCOUNTER (OUTPATIENT)
Dept: LAB | Facility: MEDICAL CENTER | Age: 40
End: 2025-07-21
Payer: COMMERCIAL

## 2025-07-21 DIAGNOSIS — E55.9 VITAMIN D DEFICIENCY: ICD-10-CM

## 2025-07-21 DIAGNOSIS — E11.9 TYPE 2 DIABETES MELLITUS WITHOUT COMPLICATION, WITHOUT LONG-TERM CURRENT USE OF INSULIN (HCC): ICD-10-CM

## 2025-07-21 DIAGNOSIS — E78.2 MIXED HYPERLIPIDEMIA: ICD-10-CM

## 2025-07-21 LAB
25(OH)D3 SERPL-MCNC: 86 NG/ML (ref 30–100)
CHOLEST SERPL-MCNC: 177 MG/DL (ref 100–199)
EST. AVERAGE GLUCOSE BLD GHB EST-MCNC: 111 MG/DL
FASTING STATUS PATIENT QL REPORTED: NORMAL
HBA1C MFR BLD: 5.5 % (ref 4–5.6)
HDLC SERPL-MCNC: 41 MG/DL
LDLC SERPL CALC-MCNC: 116 MG/DL
TRIGL SERPL-MCNC: 102 MG/DL (ref 0–149)

## 2025-07-21 PROCEDURE — 80061 LIPID PANEL: CPT

## 2025-07-21 PROCEDURE — 82306 VITAMIN D 25 HYDROXY: CPT

## 2025-07-21 PROCEDURE — 36415 COLL VENOUS BLD VENIPUNCTURE: CPT

## 2025-07-21 PROCEDURE — 83036 HEMOGLOBIN GLYCOSYLATED A1C: CPT

## 2025-07-22 ENCOUNTER — RESULTS FOLLOW-UP (OUTPATIENT)
Dept: MEDICAL GROUP | Facility: IMAGING CENTER | Age: 40
End: 2025-07-22

## 2025-07-22 ENCOUNTER — APPOINTMENT (OUTPATIENT)
Dept: LAB | Facility: MEDICAL CENTER | Age: 40
End: 2025-07-22
Payer: COMMERCIAL

## 2025-07-24 DIAGNOSIS — E11.9 TYPE 2 DIABETES MELLITUS WITHOUT COMPLICATION, WITHOUT LONG-TERM CURRENT USE OF INSULIN (HCC): Primary | ICD-10-CM

## 2025-07-24 RX ORDER — SEMAGLUTIDE 0.68 MG/ML
0.25 INJECTION, SOLUTION SUBCUTANEOUS
Qty: 3 ML | Refills: 2 | Status: SHIPPED | OUTPATIENT
Start: 2025-07-24 | End: 2025-07-29

## 2025-07-25 NOTE — PROGRESS NOTES
Patient requests 3 ml pen instead for easier refills from pharmacy.    Type 2 diabetes mellitus without complication, without long-term current use of insulin (HCC)  -     Semaglutide,0.25 or 0.5MG/DOS, (OZEMPIC, 0.25 OR 0.5 MG/DOSE,) 2 MG/3ML Solution Pen-injector; Inject 0.25 mg under the skin every 7 days.

## 2025-07-29 ENCOUNTER — APPOINTMENT (OUTPATIENT)
Dept: MEDICAL GROUP | Facility: IMAGING CENTER | Age: 40
End: 2025-07-29
Payer: COMMERCIAL

## 2025-07-29 VITALS
DIASTOLIC BLOOD PRESSURE: 70 MMHG | RESPIRATION RATE: 16 BRPM | HEIGHT: 60 IN | BODY MASS INDEX: 27.8 KG/M2 | OXYGEN SATURATION: 100 % | HEART RATE: 94 BPM | SYSTOLIC BLOOD PRESSURE: 112 MMHG | TEMPERATURE: 97.9 F | WEIGHT: 141.6 LBS

## 2025-07-29 DIAGNOSIS — E66.811 OBESITY (BMI 30.0-34.9): ICD-10-CM

## 2025-07-29 DIAGNOSIS — E78.5 DYSLIPIDEMIA: ICD-10-CM

## 2025-07-29 DIAGNOSIS — E55.9 VITAMIN D DEFICIENCY: ICD-10-CM

## 2025-07-29 DIAGNOSIS — E11.9 TYPE 2 DIABETES MELLITUS WITHOUT COMPLICATION, WITHOUT LONG-TERM CURRENT USE OF INSULIN (HCC): Primary | ICD-10-CM

## 2025-07-29 RX ORDER — SEMAGLUTIDE 0.68 MG/ML
0.5 INJECTION, SOLUTION SUBCUTANEOUS
Qty: 9 ML | Refills: 2 | Status: SHIPPED | OUTPATIENT
Start: 2025-07-29

## 2025-07-29 ASSESSMENT — FIBROSIS 4 INDEX: FIB4 SCORE: 0.44

## 2025-07-29 ASSESSMENT — PAIN SCALES - GENERAL: PAINLEVEL_OUTOF10: NO PAIN

## 2025-07-29 NOTE — PROGRESS NOTES
Subjective:     Chief Complaint   Patient presents with    Lab Results     07/21/2025    Medication Management     Ozempic was refilled at the wrong dose last week         HPI:   Mabel presents today to discuss:    Type 2 diabetes mellitus without complication, without long-term current use of insulin (HCC)  2. Obesity (BMI 30.0-34.9)  Chronic, stable condition.  Patient has lost roughly 20 lbs since starting Ozempic in April.  She remains on semaglutide 0.5 mg weekly.  She continues to work on healthy lifestyle modification.  She has not reached a plateau with weight loss on current dose.      Latest Reference Range & Units 07/21/25 12:30   Glycohemoglobin 4.0 - 5.6 % 5.5   Estim. Avg Glu mg/dL 111   Fasting Status  Fasting     Dyslipidemia associated with type 2 diabetes mellitus (HCC)  Chronic condition.  Patient has been working on healthy lifestyle modification.  She is currently on semaglutide for weight loss.     Latest Reference Range & Units 04/01/25 10:21 07/21/25 12:30   Cholesterol,Tot 100 - 199 mg/dL 219 (H) 177   Triglycerides 0 - 149 mg/dL 219 (H) 102   HDL >=40 mg/dL 43 41   LDL <100 mg/dL 132 (H) 116 (H)   (H): Data is abnormally high    Vitamin D deficiency  Patient has been taking OTC vit d3/k2 supplement     Latest Reference Range & Units 04/01/25 10:21 07/21/25 12:30   25-Hydroxy   Vitamin D 25 30 - 100 ng/mL 21 (L) 86   (L): Data is abnormally low  Past Medical History[1]  Family History   Problem Relation Age of Onset    Hyperlipidemia Mother     Hypertension Mother     Thyroid Mother     Gout Brother      Past Surgical History[2]  Social History[3]  Social History     Social History Narrative    Not on file     Current Medications and Prescriptions Ordered in Epic[4]  Patient has no known allergies.    ROS: see hpi  Gen: no fevers/chills  Pulm: no sob, no cough  CV: no chest pain, no palpitations, no edema  GI: no nausea/vomiting, no diarrhea  Skin: no rash    Objective:   Exam:  BP  112/70 (BP Location: Right arm, Patient Position: Sitting, BP Cuff Size: Adult)   Pulse 94   Temp 36.6 °C (97.9 °F) (Temporal)   Resp 16   Ht 1.524 m (5')   Wt 64.2 kg (141 lb 9.6 oz)   LMP 07/18/2025 (Exact Date)   SpO2 100%   BMI 27.65 kg/m²    Body mass index is 27.65 kg/m².    Gen: Alert and oriented, No apparent distress.  HEENT: Head atraumatic, normocephalic. Pupils equal and round.  Neck: Neck is supple without lymphadenopathy.   Lungs: Normal effort, CTA bilaterally, no wheezes, rhonchi, or rales  CV: Regular rate and rhythm. No murmurs, rubs, or gallops.  ABD: +BS. Non-tender, non-distended. No rebound, rigidity, or guarding.  Ext: No clubbing, cyanosis, edema.    Assessment & Plan:     39 y.o. female with the following -     1. Type 2 diabetes mellitus without complication, without long-term current use of insulin ([facility]) (Primary)  2. Obesity (BMI 30.0-34.9)  Established, stable condition on semaglutide 0.5 mg weekly dose.  A1c down to 5.5 from 6.2  She has successfully lost roughly 20 pounds since April.  Patient no longer in the obesity range.  Current BMI 27.65  Will continue current semaglutide dose of 0.5 mg weekly.  Will follow-up as needed.  Recommend follow-up  for any dose adjustments.    - Semaglutide,0.25 or 0.5MG/DOS, (OZEMPIC, 0.25 OR 0.5 MG/DOSE,) 2 MG/3ML Solution Pen-injector; Inject 0.5 mg under the skin every 7 days.  Dispense: 9 mL; Refill: 2    3. Dyslipidemia  Chronic, improving condition.  LDL down to 116 from 132 with lifestyle modification and weight loss from semaglutide.  Discussed LDL goal <100 to reduce risk of heart disease.  I recommend to continue with healthy lifestyle changes. I recommend start taking omega 3 fatty acid daily or red yeast rice supplementation.   Will continue to monitor.     4. Vitamin D deficiency  Resolved with OTC vitamin D3/K2 supplement.  Will continue to monitor.      Return in about 6 months (around 1/29/2026), or if symptoms worsen or fail  to improve.    BETH Herrera   [facility]    Medical Decision Making/Course:  In the course of preparing for this visit with review of the pertinent past medical history, recent and past clinic visits, current medications, and performing chart, immunization, medical history and medication reconciliation, and in the further course of obtaining the current history pertinent to the clinic visit today, performing an exam and evaluation, ordering and independently evaluating labs, tests, and/or procedures, prescribing any recommended new medications as noted above, providing any pertinent counseling and education and recommending further coordination of care. This was discussed with patient in a shared-decision making conversation, and they understand and agreed with plan of care.     Please note that this dictation was created using voice recognition software. I have made every reasonable attempt to correct obvious errors, but I expect that there are errors of grammar and possibly content that I did not discover before finalizing the note.         [1]   Past Medical History:  Diagnosis Date    Gallstone     Gout    [2] History reviewed. No pertinent surgical history.  [3]   Social History  Tobacco Use    Smoking status: Never    Smokeless tobacco: Never   Vaping Use    Vaping status: Never Used   Substance Use Topics    Alcohol use: Never    Drug use: Never   [4]   Current Outpatient Medications Ordered in Epic   Medication Sig Dispense Refill    VITAMIN D PO Take  by mouth.      Semaglutide,0.25 or 0.5MG/DOS, (OZEMPIC, 0.25 OR 0.5 MG/DOSE,) 2 MG/3ML Solution Pen-injector Inject 0.5 mg under the skin every 7 days. 9 mL 2    colchicine (COLCRYS) 0.6 MG Tab 12 (Patient taking differently: as needed.)      allopurinol (ZYLOPRIM) 100 MG Tab Take 1 Tablet by mouth every day. 90 Tablet 3    metformin (GLUCOPHAGE) 1000 MG tablet Take 1 Tablet by mouth 2 times a day with meals. 180 Tablet 3     olmesartan (BENICAR) 20 MG Tab Take 1 Tablet by mouth every day. 100 Tablet 3     No current Epic-ordered facility-administered medications on file.